# Patient Record
Sex: MALE | Race: WHITE | Employment: OTHER | URBAN - METROPOLITAN AREA
[De-identification: names, ages, dates, MRNs, and addresses within clinical notes are randomized per-mention and may not be internally consistent; named-entity substitution may affect disease eponyms.]

---

## 2017-01-19 DIAGNOSIS — I10 ESSENTIAL HYPERTENSION, BENIGN: Primary | ICD-10-CM

## 2017-01-19 RX ORDER — VALSARTAN 80 MG/1
TABLET ORAL
Qty: 90 TABLET | Refills: 0 | Status: SHIPPED | OUTPATIENT
Start: 2017-01-19 | End: 2017-01-23

## 2017-01-19 NOTE — TELEPHONE ENCOUNTER
Medication refilled per protocol.     LOV 12/20/2016    Future Appointments  Date Time Provider Nickie Bustillo   3/21/2017 7:30 AM Nurys Rose MD [facility]         Pending Prescriptions Disp Refills    VALSARTAN 80 MG Oral Tab [Pharmacy Med

## 2017-01-21 DIAGNOSIS — E11.9 TYPE 2 DIABETES MELLITUS WITHOUT COMPLICATION, UNSPECIFIED LONG TERM INSULIN USE STATUS: Primary | ICD-10-CM

## 2017-01-23 RX ORDER — VALSARTAN 160 MG/1
160 TABLET ORAL DAILY
Qty: 90 TABLET | Refills: 1 | Status: SHIPPED | OUTPATIENT
Start: 2017-01-23 | End: 2017-06-05

## 2017-01-23 NOTE — TELEPHONE ENCOUNTER
Pt called to report that [facility] mail order states they got a refill on Valsartan 80 mg, but at [address] [place] [address] 12/20/16 Pt was told by Dr Ivet Hensley that dose would be changed to 160. Notes do state that. Do you want to send Rx pended ? Routed to Dr Ivet Hensley.

## 2017-01-24 RX ORDER — FENOFIBRATE 145 MG/1
TABLET, COATED ORAL
Qty: 90 TABLET | Refills: 1 | Status: SHIPPED | OUTPATIENT
Start: 2017-01-24 | End: 2017-08-23

## 2017-01-24 RX ORDER — SITAGLIPTIN 100 MG/1
TABLET, FILM COATED ORAL
Qty: 90 TABLET | Refills: 1 | Status: SHIPPED | OUTPATIENT
Start: 2017-01-24 | End: 2017-02-13

## 2017-01-24 NOTE — TELEPHONE ENCOUNTER
Refill request sent from pharmacy for Metformin, Januvia, and Fenofibrate. Refills approved per protocol. LOV 12/20/16 and labs 12/20/16 and 09/09/016.

## 2017-01-31 ENCOUNTER — TELEPHONE (OUTPATIENT)
Dept: FAMILY MEDICINE CLINIC | Facility: CLINIC | Age: 62
End: 2017-01-31

## 2017-01-31 DIAGNOSIS — E11.9 TYPE 2 DIABETES MELLITUS WITHOUT COMPLICATION, UNSPECIFIED LONG TERM INSULIN USE STATUS: Primary | ICD-10-CM

## 2017-01-31 NOTE — TELEPHONE ENCOUNTER
Notified pt that these refills were sent to [facility] on 1/24/17 and we have confirmation that they received it.

## 2017-01-31 NOTE — TELEPHONE ENCOUNTER
Patient needs refill on FENOFIBRATE 145 MG Oral Tab, JANUVIA 100 MG Oral Tab, and METFORMIN HCL 1000 MG Oral Tab sent to [facility].

## 2017-02-13 ENCOUNTER — TELEPHONE (OUTPATIENT)
Dept: FAMILY MEDICINE CLINIC | Facility: CLINIC | Age: 62
End: 2017-02-13

## 2017-02-13 DIAGNOSIS — E11.9 TYPE 2 DIABETES MELLITUS WITHOUT COMPLICATION, UNSPECIFIED LONG TERM INSULIN USE STATUS: Primary | ICD-10-CM

## 2017-02-13 NOTE — TELEPHONE ENCOUNTER
Patient would like to know if rx for JANUVIA 100 MG Oral Tab can be re sent to [facility]. Patient has contact [facility] and they state patient does not have any refills.

## 2017-03-21 ENCOUNTER — OFFICE VISIT (OUTPATIENT)
Dept: FAMILY MEDICINE CLINIC | Facility: CLINIC | Age: 62
End: 2017-03-21

## 2017-03-21 VITALS
SYSTOLIC BLOOD PRESSURE: 136 MMHG | HEIGHT: 64 IN | RESPIRATION RATE: 16 BRPM | HEART RATE: 78 BPM | WEIGHT: 217 LBS | DIASTOLIC BLOOD PRESSURE: 70 MMHG | BODY MASS INDEX: 37.05 KG/M2 | TEMPERATURE: 98 F

## 2017-03-21 DIAGNOSIS — I10 ESSENTIAL HYPERTENSION, BENIGN: ICD-10-CM

## 2017-03-21 DIAGNOSIS — E78.5 HYPERLIPIDEMIA LDL GOAL <100: ICD-10-CM

## 2017-03-21 DIAGNOSIS — E11.9 TYPE 2 DIABETES MELLITUS WITHOUT COMPLICATION, UNSPECIFIED LONG TERM INSULIN USE STATUS: Primary | ICD-10-CM

## 2017-03-21 PROCEDURE — 99214 OFFICE O/P EST MOD 30 MIN: CPT | Performed by: FAMILY MEDICINE

## 2017-03-21 NOTE — PROGRESS NOTES
Patient presents with:  Diabetes: 3 month f/u      HPI:   Tammy Guerra is a 59year old male who presents for a diabetic visit. Typical blood sugar levels are range 130s-170s. Current diabetic medications are: Januvia and metformin.    Diet: pays at negative  Genitourinary:negative  Musculoskeletal:negative  Neurological: negative  EXAM:     /70 mmHg  Pulse 78  Temp(Src) 98 °F (36.7 °C)  Resp 16  Ht 64\"  Wt 217 lb  BMI 37.23 kg/m2  Wt Readings from Last 6 Encounters:  03/21/17 : 217 lb  12/20/1

## 2017-04-27 ENCOUNTER — MED REC SCAN ONLY (OUTPATIENT)
Dept: FAMILY MEDICINE CLINIC | Facility: CLINIC | Age: 62
End: 2017-04-27

## 2017-05-09 ENCOUNTER — OFFICE VISIT (OUTPATIENT)
Dept: FAMILY MEDICINE CLINIC | Facility: CLINIC | Age: 62
End: 2017-05-09

## 2017-05-09 VITALS
RESPIRATION RATE: 14 BRPM | WEIGHT: 216 LBS | HEIGHT: 64 IN | BODY MASS INDEX: 36.88 KG/M2 | SYSTOLIC BLOOD PRESSURE: 136 MMHG | DIASTOLIC BLOOD PRESSURE: 76 MMHG | TEMPERATURE: 99 F | HEART RATE: 70 BPM

## 2017-05-09 DIAGNOSIS — R10.13 EPIGASTRIC PAIN: ICD-10-CM

## 2017-05-09 DIAGNOSIS — R14.0 BLOATING: Primary | ICD-10-CM

## 2017-05-09 PROCEDURE — 99213 OFFICE O/P EST LOW 20 MIN: CPT | Performed by: FAMILY MEDICINE

## 2017-05-09 NOTE — PROGRESS NOTES
Patient presents with:  Abdominal Pain: x1 week w/ abdominal pain that is constant but pain sometimes increases with some nausea.  Pain level      HPI:   Liberty Peter is a 59year old male with PMH DM2, HLD, HTN who presents to the office for eval of isidoro

## 2017-06-06 RX ORDER — OMEPRAZOLE 20 MG/1
CAPSULE, DELAYED RELEASE ORAL
Qty: 90 CAPSULE | Refills: 1 | Status: SHIPPED | OUTPATIENT
Start: 2017-06-06 | End: 2017-12-29

## 2017-06-06 RX ORDER — VALSARTAN 160 MG/1
TABLET ORAL
Qty: 90 TABLET | Refills: 1 | Status: SHIPPED | OUTPATIENT
Start: 2017-06-06 | End: 2017-12-29

## 2017-07-06 LAB
DIRECT LDL: 91 MG/DL
HEMOGLOBIN A1C: 8.5 % OF TOTAL HGB

## 2017-07-11 ENCOUNTER — TELEPHONE (OUTPATIENT)
Dept: FAMILY MEDICINE CLINIC | Facility: CLINIC | Age: 62
End: 2017-07-11

## 2017-07-11 ENCOUNTER — OFFICE VISIT (OUTPATIENT)
Dept: FAMILY MEDICINE CLINIC | Facility: CLINIC | Age: 62
End: 2017-07-11

## 2017-07-11 VITALS
SYSTOLIC BLOOD PRESSURE: 134 MMHG | DIASTOLIC BLOOD PRESSURE: 70 MMHG | TEMPERATURE: 98 F | RESPIRATION RATE: 16 BRPM | WEIGHT: 215 LBS | HEART RATE: 68 BPM | HEIGHT: 64 IN | BODY MASS INDEX: 36.7 KG/M2

## 2017-07-11 DIAGNOSIS — I10 ESSENTIAL HYPERTENSION, BENIGN: ICD-10-CM

## 2017-07-11 DIAGNOSIS — M10.9 GOUTY ARTHROPATHY: ICD-10-CM

## 2017-07-11 DIAGNOSIS — E78.5 HYPERLIPIDEMIA LDL GOAL <100: ICD-10-CM

## 2017-07-11 DIAGNOSIS — E11.9 TYPE 2 DIABETES MELLITUS WITHOUT COMPLICATION, WITHOUT LONG-TERM CURRENT USE OF INSULIN (HCC): Primary | ICD-10-CM

## 2017-07-11 DIAGNOSIS — IMO0001 UNCONTROLLED TYPE 2 DIABETES MELLITUS WITHOUT COMPLICATION, WITHOUT LONG-TERM CURRENT USE OF INSULIN: Primary | ICD-10-CM

## 2017-07-11 DIAGNOSIS — Z12.5 PROSTATE CANCER SCREENING: ICD-10-CM

## 2017-07-11 PROCEDURE — 99214 OFFICE O/P EST MOD 30 MIN: CPT | Performed by: FAMILY MEDICINE

## 2017-07-11 NOTE — PROGRESS NOTES
Patient presents with:  Diabetes     HPI:   Thuy Delgadillo is a 59year old male who presents for a diabetic visit. Typical blood sugar levels are elevated more than usual, but he thinks this will return to normal with next check.    Current diabetic med negative  Genitourinary:negative  Neurological: negative    EXAM:     /70   Pulse 68   Temp 98.3 °F (36.8 °C)   Resp 16   Ht 64\"   Wt 215 lb   BMI 36.90 kg/m²   Wt Readings from Last 6 Encounters:  07/11/17 : 215 lb  05/09/17 : 216 lb  03/21/17 : 21

## 2017-07-20 DIAGNOSIS — E11.9 TYPE 2 DIABETES MELLITUS WITHOUT COMPLICATION, UNSPECIFIED LONG TERM INSULIN USE STATUS: ICD-10-CM

## 2017-07-27 ENCOUNTER — OFFICE VISIT (OUTPATIENT)
Dept: FAMILY MEDICINE CLINIC | Facility: CLINIC | Age: 62
End: 2017-07-27

## 2017-07-27 VITALS
HEIGHT: 64 IN | TEMPERATURE: 97 F | BODY MASS INDEX: 36.54 KG/M2 | DIASTOLIC BLOOD PRESSURE: 70 MMHG | WEIGHT: 214 LBS | SYSTOLIC BLOOD PRESSURE: 136 MMHG | HEART RATE: 60 BPM

## 2017-07-27 DIAGNOSIS — J01.40 ACUTE NON-RECURRENT PANSINUSITIS: Primary | ICD-10-CM

## 2017-07-27 PROCEDURE — 99213 OFFICE O/P EST LOW 20 MIN: CPT | Performed by: PHYSICIAN ASSISTANT

## 2017-07-27 RX ORDER — AZITHROMYCIN 250 MG/1
TABLET, FILM COATED ORAL
Qty: 6 TABLET | Refills: 0 | Status: SHIPPED | OUTPATIENT
Start: 2017-07-27 | End: 2017-10-17 | Stop reason: ALTCHOICE

## 2017-07-27 NOTE — PROGRESS NOTES
CC:  Patient presents with:  Sinus Problem: x 9 days, sinus pressure drainage      HPI: Ananda Wang presents for complaints of sinus pain and pressure, nasal congestion at night, a mild cough at night, and purulent nasal drainage.  Symptoms have been present for arms/legs; normal range of motion  Skin: No rashes or new skin lesions; no unusual moles  Neurological:  No weakness, numbness, or tingling  Hematological:  No unusual bruises or bleeding  Psychiatric/Behavioral: Normal mood; no anxiety; normal behavior barriers to learning. Medical education done. Outcome: Patient verbalizes understanding. Patient is notified to call with any questions, complications, allergies, or worsening or changing symptoms.   Patient is to call with any side effects or complications

## 2017-08-02 DIAGNOSIS — E11.9 TYPE 2 DIABETES MELLITUS WITHOUT COMPLICATION, UNSPECIFIED LONG TERM INSULIN USE STATUS: ICD-10-CM

## 2017-08-03 RX ORDER — SITAGLIPTIN 100 MG/1
TABLET, FILM COATED ORAL
Qty: 90 TABLET | Refills: 1 | Status: SHIPPED | OUTPATIENT
Start: 2017-08-03 | End: 2018-01-16

## 2017-08-23 DIAGNOSIS — E11.9 TYPE 2 DIABETES MELLITUS WITHOUT COMPLICATION, UNSPECIFIED LONG TERM INSULIN USE STATUS: ICD-10-CM

## 2017-08-23 DIAGNOSIS — E78.5 HYPERLIPIDEMIA LDL GOAL <100: Primary | ICD-10-CM

## 2017-08-24 RX ORDER — FENOFIBRATE 145 MG/1
TABLET, COATED ORAL
Qty: 90 TABLET | Refills: 0 | Status: SHIPPED | OUTPATIENT
Start: 2017-08-24 | End: 2017-11-12

## 2017-08-24 NOTE — TELEPHONE ENCOUNTER
Medication refilled per protocol.        Signed Prescriptions Disp Refills    FENOFIBRATE 145 MG Oral Tab 90 tablet 0      Sig: Take 1 tablet by mouth  daily        Authorizing Provider: Myesha Osborn        Ordering User: Severa Mini

## 2017-10-09 DIAGNOSIS — E11.9 TYPE 2 DIABETES MELLITUS WITHOUT COMPLICATION, UNSPECIFIED LONG TERM INSULIN USE STATUS: ICD-10-CM

## 2017-10-17 ENCOUNTER — OFFICE VISIT (OUTPATIENT)
Dept: FAMILY MEDICINE CLINIC | Facility: CLINIC | Age: 62
End: 2017-10-17

## 2017-10-17 VITALS
WEIGHT: 213 LBS | TEMPERATURE: 99 F | RESPIRATION RATE: 16 BRPM | BODY MASS INDEX: 36.82 KG/M2 | HEART RATE: 92 BPM | HEIGHT: 63.75 IN | SYSTOLIC BLOOD PRESSURE: 120 MMHG | DIASTOLIC BLOOD PRESSURE: 60 MMHG

## 2017-10-17 DIAGNOSIS — IMO0001 UNCONTROLLED TYPE 2 DIABETES MELLITUS WITHOUT COMPLICATION, WITHOUT LONG-TERM CURRENT USE OF INSULIN: ICD-10-CM

## 2017-10-17 DIAGNOSIS — Z00.00 ANNUAL PHYSICAL EXAM: Primary | ICD-10-CM

## 2017-10-17 DIAGNOSIS — M20.42 HAMMERTOE OF SECOND TOE OF LEFT FOOT: ICD-10-CM

## 2017-10-17 DIAGNOSIS — E78.5 HYPERLIPIDEMIA LDL GOAL <100: ICD-10-CM

## 2017-10-17 PROCEDURE — 99396 PREV VISIT EST AGE 40-64: CPT | Performed by: FAMILY MEDICINE

## 2017-10-17 PROCEDURE — 90686 IIV4 VACC NO PRSV 0.5 ML IM: CPT | Performed by: FAMILY MEDICINE

## 2017-10-17 PROCEDURE — 90471 IMMUNIZATION ADMIN: CPT | Performed by: FAMILY MEDICINE

## 2017-10-17 NOTE — PROGRESS NOTES
Patient presents with:  Physical     HPI:   Anny Lewis is a 59year old male who presents for a complete physical exam.     Last colonoscopy:  c-scope last year, repeat in 5 yrs. Last PSA:  Remains controlled. Immunizations: TDaP 6/2015.   PNA 9/20 7931;656(69): 4741-3162   (http://education. [employer]/faq/YBZ820)     TRIG 437 (H) 10/10/2017 07:28 AM      No results found for: PSA        Current Outpatient Prescriptions:  METFORMIN HCL 1000 MG Oral Tab TAKE 1 TABLET BY MOUTH  TWICE A DAY WI 213 lb   BMI 36.85 kg/m²   Body mass index is 36.85 kg/m². General appearance: alert, appears stated age and cooperative  Eyes: conjunctivae/corneas clear. PERRL, EOM's intact.    Ears: normal TM's and external ear canals both ears  Nose: Nares normal. 3  10/17/17      RTC 3 mo .  Labs ordered

## 2017-11-12 DIAGNOSIS — E11.9 TYPE 2 DIABETES MELLITUS WITHOUT COMPLICATION, UNSPECIFIED LONG TERM INSULIN USE STATUS: ICD-10-CM

## 2017-11-12 DIAGNOSIS — E78.5 HYPERLIPIDEMIA LDL GOAL <100: ICD-10-CM

## 2017-11-13 RX ORDER — FENOFIBRATE 145 MG/1
TABLET, COATED ORAL
Qty: 90 TABLET | Refills: 1 | Status: SHIPPED | OUTPATIENT
Start: 2017-11-13 | End: 2018-04-28

## 2018-01-02 RX ORDER — OMEPRAZOLE 20 MG/1
CAPSULE, DELAYED RELEASE ORAL
Qty: 90 CAPSULE | Refills: 3 | Status: SHIPPED | OUTPATIENT
Start: 2018-01-02 | End: 2018-12-03

## 2018-01-02 RX ORDER — VALSARTAN 160 MG/1
TABLET ORAL
Qty: 90 TABLET | Refills: 1 | Status: SHIPPED | OUTPATIENT
Start: 2018-01-02 | End: 2018-06-16

## 2018-01-16 DIAGNOSIS — E11.9 TYPE 2 DIABETES MELLITUS WITHOUT COMPLICATION, UNSPECIFIED LONG TERM INSULIN USE STATUS: ICD-10-CM

## 2018-01-17 RX ORDER — SITAGLIPTIN 100 MG/1
TABLET, FILM COATED ORAL
Qty: 90 TABLET | Refills: 1 | Status: SHIPPED | OUTPATIENT
Start: 2018-01-17 | End: 2018-07-01

## 2018-03-08 LAB
CHOL/HDLC RATIO: 9.1 (CALC)
CHOLESTEROL, TOTAL: 209 MG/DL
CREATININE, RANDOM URINE: 245 MG/DL (ref 20–370)
DIRECT LDL: 100 MG/DL
HDL CHOLESTEROL: 23 MG/DL
HEMOGLOBIN A1C: 8.9 % OF TOTAL HGB
MICROALBUMIN/CREATININE RATIO, RANDOM URINE: 12 MCG/MG CREAT
MICROALBUMIN: 3 MG/DL
NON-HDL CHOLESTEROL: 186 MG/DL (CALC)
TRIGLYCERIDES: 574 MG/DL

## 2018-03-14 ENCOUNTER — OFFICE VISIT (OUTPATIENT)
Dept: FAMILY MEDICINE CLINIC | Facility: CLINIC | Age: 63
End: 2018-03-14

## 2018-03-14 VITALS
RESPIRATION RATE: 14 BRPM | HEART RATE: 84 BPM | WEIGHT: 209.63 LBS | DIASTOLIC BLOOD PRESSURE: 60 MMHG | SYSTOLIC BLOOD PRESSURE: 126 MMHG | HEIGHT: 64 IN | TEMPERATURE: 98 F | BODY MASS INDEX: 35.79 KG/M2

## 2018-03-14 DIAGNOSIS — E78.5 HYPERLIPIDEMIA LDL GOAL <100: ICD-10-CM

## 2018-03-14 DIAGNOSIS — I10 ESSENTIAL HYPERTENSION, BENIGN: ICD-10-CM

## 2018-03-14 DIAGNOSIS — E11.9 TYPE 2 DIABETES MELLITUS WITHOUT COMPLICATION, WITHOUT LONG-TERM CURRENT USE OF INSULIN (HCC): Primary | ICD-10-CM

## 2018-03-14 PROCEDURE — 99214 OFFICE O/P EST MOD 30 MIN: CPT | Performed by: FAMILY MEDICINE

## 2018-03-14 NOTE — PROGRESS NOTES
Patient presents with:  Diabetes     HPI:   Trev Walker is a 57year old male who presents for a diabetic visit. Seems to go through cycles where the sugars are high regardless of what he eats. Typical blood sugar levels are 165-190 more recently. or equivalent per week         Comment: 3 servings 2-3 days a week     REVIEW OF SYSTEMS:     Constitutional: negative  Eyes: negative  Ears, nose, mouth, throat, and face: negative  Respiratory: negative  Cardiovascular: negative  Gastrointestinal: negati

## 2018-03-25 DIAGNOSIS — E11.9 TYPE 2 DIABETES MELLITUS WITHOUT COMPLICATION (HCC): ICD-10-CM

## 2018-03-26 NOTE — TELEPHONE ENCOUNTER
Rx request for Metformin failed protocol because last A1C was 8.9 on 3/7/18. LOV 3/14/18 and at that time, pt was advised to follow up in 3 months.  Future Appointments  Date Time Provider Nickie Bustillo   7/10/2018 7:30 AM Manjinder Devi MD [facility] 3

## 2018-04-28 DIAGNOSIS — E11.9 TYPE 2 DIABETES MELLITUS WITHOUT COMPLICATION (HCC): ICD-10-CM

## 2018-04-28 DIAGNOSIS — E78.5 HYPERLIPIDEMIA LDL GOAL <100: ICD-10-CM

## 2018-04-30 RX ORDER — FENOFIBRATE 145 MG/1
TABLET, COATED ORAL
Qty: 90 TABLET | Refills: 1 | Status: SHIPPED | OUTPATIENT
Start: 2018-04-30 | End: 2018-10-12

## 2018-04-30 NOTE — TELEPHONE ENCOUNTER
Incoming request for Fenofibrate. LOV 3/14/2018 and last labs done 3/7/2018. Future Appointments  Date Time Provider Nickie Bsutillo   7/10/2018 7:30 AM Sandhya Friedman MD [facility]     Medication passed protocol. Medication sent.

## 2018-06-18 RX ORDER — VALSARTAN 160 MG/1
TABLET ORAL
Qty: 90 TABLET | Refills: 3 | Status: SHIPPED | OUTPATIENT
Start: 2018-06-18 | End: 2018-07-30

## 2018-06-18 NOTE — TELEPHONE ENCOUNTER
Refill request for Valsartan passes protocol , but There is a contraindication due to pt's allergy to Lisinopril. Routed to Dr Maikol Sam.

## 2018-07-01 DIAGNOSIS — E11.9 TYPE 2 DIABETES MELLITUS WITHOUT COMPLICATION (HCC): ICD-10-CM

## 2018-07-03 RX ORDER — SITAGLIPTIN 100 MG/1
TABLET, FILM COATED ORAL
Qty: 90 TABLET | Refills: 0 | Status: SHIPPED | OUTPATIENT
Start: 2018-07-03 | End: 2018-09-21

## 2018-07-10 ENCOUNTER — TELEPHONE (OUTPATIENT)
Dept: FAMILY MEDICINE CLINIC | Facility: CLINIC | Age: 63
End: 2018-07-10

## 2018-07-10 NOTE — TELEPHONE ENCOUNTER
Patient has a diabetic check scheduled for 07/17/18 and is wondering if blood work is needed. If blood work is needed please send orders to [email or web address].  Please advise patient

## 2018-07-17 ENCOUNTER — OFFICE VISIT (OUTPATIENT)
Dept: FAMILY MEDICINE CLINIC | Facility: CLINIC | Age: 63
End: 2018-07-17
Payer: COMMERCIAL

## 2018-07-17 VITALS
HEIGHT: 64 IN | BODY MASS INDEX: 35.85 KG/M2 | HEART RATE: 76 BPM | DIASTOLIC BLOOD PRESSURE: 78 MMHG | SYSTOLIC BLOOD PRESSURE: 130 MMHG | TEMPERATURE: 99 F | RESPIRATION RATE: 14 BRPM | WEIGHT: 210 LBS

## 2018-07-17 DIAGNOSIS — E11.9 TYPE 2 DIABETES MELLITUS WITHOUT COMPLICATION, WITHOUT LONG-TERM CURRENT USE OF INSULIN (HCC): Primary | ICD-10-CM

## 2018-07-17 DIAGNOSIS — I10 ESSENTIAL HYPERTENSION, BENIGN: ICD-10-CM

## 2018-07-17 DIAGNOSIS — E78.5 HYPERLIPIDEMIA LDL GOAL <100: ICD-10-CM

## 2018-07-17 LAB
CARTRIDGE LOT#: 858 NUMERIC
HEMOGLOBIN A1C: 9.1 % (ref 4.3–5.6)

## 2018-07-17 PROCEDURE — 99214 OFFICE O/P EST MOD 30 MIN: CPT | Performed by: FAMILY MEDICINE

## 2018-07-17 PROCEDURE — 83036 HEMOGLOBIN GLYCOSYLATED A1C: CPT | Performed by: FAMILY MEDICINE

## 2018-07-17 RX ORDER — GLIMEPIRIDE 2 MG/1
2 TABLET ORAL 2 TIMES DAILY
Qty: 60 TABLET | Refills: 2 | Status: SHIPPED | OUTPATIENT
Start: 2018-07-17 | End: 2018-09-26

## 2018-07-17 NOTE — PROGRESS NOTES
Patient presents with:  Diabetes: f/u      HPI:   Jean Marie Glover is a 57year old male who presents for a diabetic visit. Last A1C 8.9. Sugars improved some - his home range is 150-215.     Typical blood sugar levels are 150-215  Current diabetic medicati tobacco: Never Used    Alcohol use Yes  3.6 - 5.4 oz/week    6 - 9 Standard drinks or equivalent per week         Comment: 3 servings 2-3 days a week       REVIEW OF SYSTEMS:     Constitutional: negative  Eyes: negative  Ears, nose, mouth, throat, and face EMG 3  07/17/18    Return in about 3 months (around 10/17/2018) for Annual Physical, Diabetic follow-up.

## 2018-07-30 ENCOUNTER — TELEPHONE (OUTPATIENT)
Dept: FAMILY MEDICINE CLINIC | Facility: CLINIC | Age: 63
End: 2018-07-30

## 2018-07-30 RX ORDER — LOSARTAN POTASSIUM 100 MG/1
100 TABLET ORAL DAILY
Qty: 30 TABLET | Refills: 0 | Status: SHIPPED | OUTPATIENT
Start: 2018-07-30 | End: 2018-08-21

## 2018-07-30 RX ORDER — LOSARTAN POTASSIUM 100 MG/1
100 TABLET ORAL DAILY
Qty: 90 TABLET | Refills: 3 | Status: SHIPPED | OUTPATIENT
Start: 2018-07-30 | End: 2019-06-28

## 2018-07-30 NOTE — TELEPHONE ENCOUNTER
Pt received email regarding recall of valsartan. Pt needs replacement rx to be placed. He is requesting 30 days from local [facility] in [place] on main. Long term rx [facility]. Please review chart and place appropriate orders.  Routed to Dr Jerrald Osler

## 2018-07-30 NOTE — TELEPHONE ENCOUNTER
Taking valsartan 160mg  Will change to losartan 100mg. 30 days to local, long term to mail order. Orders all sent in. Can we let pt know?

## 2018-08-17 ENCOUNTER — TELEPHONE (OUTPATIENT)
Dept: FAMILY MEDICINE CLINIC | Facility: CLINIC | Age: 63
End: 2018-08-17

## 2018-08-17 DIAGNOSIS — I10 ESSENTIAL HYPERTENSION, BENIGN: Primary | ICD-10-CM

## 2018-08-20 RX ORDER — LOSARTAN POTASSIUM 100 MG/1
TABLET ORAL
Qty: 30 TABLET | Refills: 0 | OUTPATIENT
Start: 2018-08-20

## 2018-08-20 NOTE — TELEPHONE ENCOUNTER
Refill request for Lisinopril passes protocol, but there is a contraindication: Reactive airway disease. Routed to Dr Ginny Vogel.

## 2018-08-21 RX ORDER — LOSARTAN POTASSIUM 100 MG/1
100 TABLET ORAL DAILY
Qty: 30 TABLET | Refills: 0 | Status: SHIPPED | OUTPATIENT
Start: 2018-08-21 | End: 2018-09-26

## 2018-08-21 NOTE — TELEPHONE ENCOUNTER
Patient is calling he is very busy leaving for a long vacation and he needs medicaton before he leaves on 8/24/18. I noticed two medications are listed he is requesting Losartan. Please review and call the patient back. Thank you.

## 2018-08-21 NOTE — TELEPHONE ENCOUNTER
Called and talked to patient mail order won't be sent out until tomorrow he needs short fill to local pharmacy sent 30 days to [facility]

## 2018-08-21 NOTE — TELEPHONE ENCOUNTER
A month ago we gave 30 days to local pharmacy and 90 days +3 refills to mail in. Why is he out of med? If he needs some for the trip ok to call into local, but im not sure why the m ail order shouldn't be there.

## 2018-09-07 DIAGNOSIS — E11.9 TYPE 2 DIABETES MELLITUS WITHOUT COMPLICATION (HCC): ICD-10-CM

## 2018-09-21 DIAGNOSIS — E11.9 TYPE 2 DIABETES MELLITUS WITHOUT COMPLICATION (HCC): ICD-10-CM

## 2018-09-24 RX ORDER — SITAGLIPTIN 100 MG/1
TABLET, FILM COATED ORAL
Qty: 90 TABLET | Refills: 0 | Status: SHIPPED | OUTPATIENT
Start: 2018-09-24 | End: 2018-12-13

## 2018-09-26 ENCOUNTER — OFFICE VISIT (OUTPATIENT)
Dept: FAMILY MEDICINE CLINIC | Facility: CLINIC | Age: 63
End: 2018-09-26
Payer: COMMERCIAL

## 2018-09-26 VITALS
BODY MASS INDEX: 36.75 KG/M2 | DIASTOLIC BLOOD PRESSURE: 60 MMHG | HEART RATE: 84 BPM | RESPIRATION RATE: 16 BRPM | TEMPERATURE: 99 F | HEIGHT: 63.5 IN | WEIGHT: 210 LBS | SYSTOLIC BLOOD PRESSURE: 110 MMHG

## 2018-09-26 DIAGNOSIS — Z23 NEEDS FLU SHOT: ICD-10-CM

## 2018-09-26 DIAGNOSIS — I10 ESSENTIAL HYPERTENSION, BENIGN: ICD-10-CM

## 2018-09-26 DIAGNOSIS — Z00.00 ANNUAL PHYSICAL EXAM: Primary | ICD-10-CM

## 2018-09-26 DIAGNOSIS — E66.01 SEVERE OBESITY (BMI 35.0-39.9) WITH COMORBIDITY (HCC): ICD-10-CM

## 2018-09-26 DIAGNOSIS — IMO0001 UNCONTROLLED TYPE 2 DIABETES MELLITUS WITHOUT COMPLICATION, WITHOUT LONG-TERM CURRENT USE OF INSULIN: ICD-10-CM

## 2018-09-26 DIAGNOSIS — E78.5 HYPERLIPIDEMIA LDL GOAL <100: ICD-10-CM

## 2018-09-26 LAB
CARTRIDGE LOT#: ABNORMAL NUMERIC
HEMOGLOBIN A1C: 6.6 % (ref 4.3–5.6)

## 2018-09-26 PROCEDURE — 90471 IMMUNIZATION ADMIN: CPT | Performed by: FAMILY MEDICINE

## 2018-09-26 PROCEDURE — 90686 IIV4 VACC NO PRSV 0.5 ML IM: CPT | Performed by: FAMILY MEDICINE

## 2018-09-26 PROCEDURE — 83036 HEMOGLOBIN GLYCOSYLATED A1C: CPT | Performed by: FAMILY MEDICINE

## 2018-09-26 PROCEDURE — 99396 PREV VISIT EST AGE 40-64: CPT | Performed by: FAMILY MEDICINE

## 2018-09-26 RX ORDER — GLIMEPIRIDE 2 MG/1
2 TABLET ORAL
Qty: 90 TABLET | Refills: 2 | Status: SHIPPED | OUTPATIENT
Start: 2018-09-26 | End: 2019-06-28

## 2018-09-26 NOTE — PROGRESS NOTES
Patient presents with:  Diabetes: three month diabetic check  Imm/Inj: flu shot     HPI:   Mirta Osuna is a 57year old male who presents for a complete physical exam.     Last colonoscopy:  2016 - due again in 2021. Last PSA:  1.4 a year ago.   Rare n (http://education. [employer]. com/faq/BFL555)      TRIG 574 (H) 03/07/2018 07:19 AM      No results found for: PSA        Current Outpatient Medications:  JANUVIA 100 MG Oral Tab TAKE 1 TABLET BY MOUTH  DAILY Disp: 90 tablet Rfl: 0   METFORMIN HCL 1 negative other than noted above. EXAM:   /60   Pulse 84   Temp 98.6 °F (37 °C) (Oral)   Resp 16   Ht 63.5\"   Wt 210 lb   BMI 36.62 kg/m²   Body mass index is 36.62 kg/m². General appearance: alert, appears stated age and cooperative.   obese Severe obesity (BMI 35.0-39. 9) with comorbidity ([address]  Stable weight. Work on healthy dieting.      6. Needs flu shot  Given.  - Josefa Gonzalez 0.5 Armando Peck M.D.   [facility] 3  09/26/18    RTC

## 2018-10-12 DIAGNOSIS — E78.5 HYPERLIPIDEMIA LDL GOAL <100: ICD-10-CM

## 2018-10-12 DIAGNOSIS — E11.9 TYPE 2 DIABETES MELLITUS WITHOUT COMPLICATION (HCC): ICD-10-CM

## 2018-10-13 RX ORDER — FENOFIBRATE 145 MG/1
TABLET, COATED ORAL
Qty: 90 TABLET | Refills: 1 | Status: SHIPPED | OUTPATIENT
Start: 2018-10-13 | End: 2019-04-11

## 2018-11-30 DIAGNOSIS — E11.9 TYPE 2 DIABETES MELLITUS WITHOUT COMPLICATION (HCC): ICD-10-CM

## 2018-12-04 RX ORDER — OMEPRAZOLE 20 MG/1
CAPSULE, DELAYED RELEASE ORAL
Qty: 90 CAPSULE | Refills: 3 | Status: SHIPPED | OUTPATIENT
Start: 2018-12-04 | End: 2019-06-28

## 2018-12-04 NOTE — TELEPHONE ENCOUNTER
LOV 9/26/2018     Future Appointments   Date Time Provider Nickie Bustillo   1/15/2019  7:30 AM Shilpi Tapia MD [facility] 3 [facility]        Refill request for:    Requested Prescriptions     Pending Prescriptions Disp Refills   • OMEPRAZOLE 20 MG Oral

## 2018-12-04 NOTE — TELEPHONE ENCOUNTER
Name from pharmacy: MetFORMIN 1000MG TABLET          Will file in chart as: METFORMIN HCL 1000 MG Oral Tab    Sig: TAKE 1 TABLET BY MOUTH  TWICE A DAY WITH MEALS    Disp:  180 tablet    Refills:  0 (Pharmacy requested: Not specified)    Start: 11/30/2018

## 2018-12-13 DIAGNOSIS — E11.9 TYPE 2 DIABETES MELLITUS WITHOUT COMPLICATION (HCC): ICD-10-CM

## 2018-12-14 RX ORDER — SITAGLIPTIN 100 MG/1
TABLET, FILM COATED ORAL
Qty: 90 TABLET | Refills: 0 | Status: SHIPPED | OUTPATIENT
Start: 2018-12-14 | End: 2019-04-10

## 2019-01-15 ENCOUNTER — OFFICE VISIT (OUTPATIENT)
Dept: FAMILY MEDICINE CLINIC | Facility: CLINIC | Age: 64
End: 2019-01-15
Payer: COMMERCIAL

## 2019-01-15 VITALS
WEIGHT: 214.19 LBS | SYSTOLIC BLOOD PRESSURE: 138 MMHG | RESPIRATION RATE: 16 BRPM | DIASTOLIC BLOOD PRESSURE: 72 MMHG | HEART RATE: 68 BPM | TEMPERATURE: 98 F | HEIGHT: 63.5 IN | BODY MASS INDEX: 37.48 KG/M2

## 2019-01-15 DIAGNOSIS — E66.01 SEVERE OBESITY (BMI 35.0-39.9) WITH COMORBIDITY (HCC): ICD-10-CM

## 2019-01-15 DIAGNOSIS — E11.9 TYPE 2 DIABETES MELLITUS WITHOUT COMPLICATION (HCC): ICD-10-CM

## 2019-01-15 DIAGNOSIS — I10 ESSENTIAL HYPERTENSION, BENIGN: Primary | ICD-10-CM

## 2019-01-15 DIAGNOSIS — E78.5 HYPERLIPIDEMIA LDL GOAL <100: ICD-10-CM

## 2019-01-15 LAB
CARTRIDGE LOT#: ABNORMAL NUMERIC
HEMOGLOBIN A1C: 6.2 % (ref 4.3–5.6)

## 2019-01-15 PROCEDURE — 99214 OFFICE O/P EST MOD 30 MIN: CPT | Performed by: FAMILY MEDICINE

## 2019-01-15 PROCEDURE — 83036 HEMOGLOBIN GLYCOSYLATED A1C: CPT | Performed by: FAMILY MEDICINE

## 2019-01-15 NOTE — PROGRESS NOTES
No chief complaint on file. HPI:   Gregorio Ferrell is a 70year old male who presents for a diabetic visit. Last year A1C went from 9.1 to 6.6 in 2 months.     Typical blood sugar levels are 80 this AM.    Current diabetic medications are: Januvia, dayanara 1.00        Years: 20.00        Pack years: 20        Types: Cigarettes        Quit date: 1990        Years since quittin.0      Smokeless tobacco: Never Used    Alcohol use:  Yes      Alcohol/week: 3.6 - 5.4 oz      Types: 6 - 9 Standard drinks or ARB    3. Hyperlipidemia LDL goal <100  On fenofibrate, no statin  Recheck lipids. LDL goal 100    4. Severe obesity (BMI 35.0-39. 9) with comorbidity ([address]  Need to make improvement, currently stable  Watch diet, more exercise.        Return May , for Diab

## 2019-04-05 ENCOUNTER — OFFICE VISIT (OUTPATIENT)
Dept: FAMILY MEDICINE CLINIC | Facility: CLINIC | Age: 64
End: 2019-04-05
Payer: COMMERCIAL

## 2019-04-05 ENCOUNTER — TELEPHONE (OUTPATIENT)
Dept: FAMILY MEDICINE CLINIC | Facility: CLINIC | Age: 64
End: 2019-04-05

## 2019-04-05 ENCOUNTER — HOSPITAL ENCOUNTER (OUTPATIENT)
Dept: CT IMAGING | Age: 64
Discharge: HOME | End: 2019-04-05
Attending: FAMILY MEDICINE
Payer: COMMERCIAL

## 2019-04-05 VITALS
WEIGHT: 218 LBS | SYSTOLIC BLOOD PRESSURE: 136 MMHG | OXYGEN SATURATION: 98 % | DIASTOLIC BLOOD PRESSURE: 70 MMHG | TEMPERATURE: 99 F | HEART RATE: 78 BPM | HEIGHT: 63.5 IN | BODY MASS INDEX: 38.15 KG/M2 | RESPIRATION RATE: 16 BRPM

## 2019-04-05 DIAGNOSIS — R10.9 FLANK PAIN: Primary | ICD-10-CM

## 2019-04-05 DIAGNOSIS — R10.9 FLANK PAIN: ICD-10-CM

## 2019-04-05 DIAGNOSIS — R31.29 MICROSCOPIC HEMATURIA: ICD-10-CM

## 2019-04-05 DIAGNOSIS — N20.0 RENAL LITHIASIS: Primary | ICD-10-CM

## 2019-04-05 PROCEDURE — 99214 OFFICE O/P EST MOD 30 MIN: CPT | Performed by: FAMILY MEDICINE

## 2019-04-05 PROCEDURE — 74176 CT ABD & PELVIS W/O CONTRAST: CPT | Performed by: FAMILY MEDICINE

## 2019-04-05 PROCEDURE — 87086 URINE CULTURE/COLONY COUNT: CPT | Performed by: FAMILY MEDICINE

## 2019-04-05 PROCEDURE — 81003 URINALYSIS AUTO W/O SCOPE: CPT | Performed by: FAMILY MEDICINE

## 2019-04-05 RX ORDER — TAMSULOSIN HYDROCHLORIDE 0.4 MG/1
0.4 CAPSULE ORAL DAILY
Qty: 30 CAPSULE | Refills: 0 | Status: SHIPPED | OUTPATIENT
Start: 2019-04-05 | End: 2019-05-05

## 2019-04-05 RX ORDER — TRAMADOL HYDROCHLORIDE 50 MG/1
50 TABLET ORAL EVERY 8 HOURS PRN
Qty: 20 TABLET | Refills: 0 | Status: SHIPPED | OUTPATIENT
Start: 2019-04-05 | End: 2019-04-13

## 2019-04-05 NOTE — PROGRESS NOTES
Chief Complaint:  Patient presents with:  Low Back Pain: Started yesterday, pain on Left Side of Low back     HPI:  This is a 70year old male patient presenting for Low Back Pain (Started yesterday, pain on Left Side of Low back )    Developed slight pain TONSILLECTOMY        No family history on file.    Social History    Tobacco Use      Smoking status: Former Smoker        Packs/day: 1.00        Years: 20.00        Pack years: 20        Types: Cigarettes        Quit date: 1/1/1990        Years since [facility] L4-S1, +5/5 strength LE B/L, +2/4 patellar reflexes B/L  EXTREMITIES: warm and well perfused  PSYCH: pleasant and cooperative, no obvious depression or anxiety    Results for orders placed or performed in visit on 04/05/19   URINALYSIS, AUTO, W/O SCOPE KIDNEYSTONE 2D RNDR(NO IV,NO ORAL)(CPT=74176); Future  -     traMADol HCl 50 MG Oral Tab; Take 1 tablet (50 mg total) by mouth every 8 (eight) hours as needed for Pain.     Concerning signs and symptoms that warrant returning to the clinic reviewed and nia

## 2019-04-05 NOTE — TELEPHONE ENCOUNTER
Left detailed msg regarding pt's CT on his approved VM. Flomax 0.4 mg once daily sent to his  Home	[place]. Patient told to go to THE [facility] ED with severe pain not controlled by meds given by Dr. Kary Sánchez today.     Dr. Kary Sánchez will likely refer to [facility]

## 2019-04-08 ENCOUNTER — TELEPHONE (OUTPATIENT)
Dept: FAMILY MEDICINE CLINIC | Facility: CLINIC | Age: 64
End: 2019-04-08

## 2019-04-08 DIAGNOSIS — N21.0 BLADDER STONE: ICD-10-CM

## 2019-04-08 DIAGNOSIS — N20.1 URETEROLITHIASIS: Primary | ICD-10-CM

## 2019-04-08 NOTE — TELEPHONE ENCOUNTER
Louann Perez,  [facility]  [place] [id number] [phone]     Called [facility] to call back to see if we can get a sooner appointment

## 2019-04-08 NOTE — TELEPHONE ENCOUNTER
Patient is calling stating he was given referral to see Urologist. Patient was not able to get in right away. Patient is scheduled to see Dr. Brandon Mohamud 5/6/19.  Patient wants to know if this is okay with Dr. April Martinez or should he be seen sooner by someone el

## 2019-04-10 ENCOUNTER — TELEPHONE (OUTPATIENT)
Dept: FAMILY MEDICINE CLINIC | Facility: CLINIC | Age: 64
End: 2019-04-10

## 2019-04-10 DIAGNOSIS — E11.9 TYPE 2 DIABETES MELLITUS WITHOUT COMPLICATION (HCC): ICD-10-CM

## 2019-04-10 NOTE — TELEPHONE ENCOUNTER
Patient is calling requesting a refill on JANUVIA 100 MG Oral Tab medication.  Would like is sent to his new mail order   [address] [place] [address]

## 2019-04-10 NOTE — TELEPHONE ENCOUNTER
Called and talked to patient [facility] is not a pharmacy but it is a , the [facility]  [phone] sent new RX to them

## 2019-04-11 ENCOUNTER — TELEPHONE (OUTPATIENT)
Dept: FAMILY MEDICINE CLINIC | Facility: CLINIC | Age: 64
End: 2019-04-11

## 2019-04-11 DIAGNOSIS — E11.9 TYPE 2 DIABETES MELLITUS WITHOUT COMPLICATION (HCC): ICD-10-CM

## 2019-04-11 DIAGNOSIS — E78.5 HYPERLIPIDEMIA LDL GOAL <100: ICD-10-CM

## 2019-04-11 NOTE — TELEPHONE ENCOUNTER
Pt is requesting a refill of Fenofibrate because his insurance is expiring at the end of the months. Request fails protocol because last lipids were done 3/7/18. Pt has labs ordered, but he has not completed the labs.     Advised that pt have fasting la

## 2019-04-11 NOTE — TELEPHONE ENCOUNTER
Patient is calling in his insurance is terminating at the end of the month because he is retiring and he is needing his Fenofibrate 145 mg refilled please send to:    Michael Poe, [facility] [address] [phone], [phone]

## 2019-04-13 PROCEDURE — 82365 CALCULUS SPECTROSCOPY: CPT | Performed by: UROLOGY

## 2019-04-17 RX ORDER — FENOFIBRATE 145 MG/1
145 TABLET, COATED ORAL
Qty: 90 TABLET | Refills: 0 | Status: SHIPPED | OUTPATIENT
Start: 2019-04-17 | End: 2019-06-28

## 2019-06-28 DIAGNOSIS — E11.9 TYPE 2 DIABETES MELLITUS WITHOUT COMPLICATION (HCC): ICD-10-CM

## 2019-06-28 DIAGNOSIS — IMO0001 UNCONTROLLED TYPE 2 DIABETES MELLITUS WITHOUT COMPLICATION, WITHOUT LONG-TERM CURRENT USE OF INSULIN: ICD-10-CM

## 2019-06-28 DIAGNOSIS — E78.5 HYPERLIPIDEMIA LDL GOAL <100: ICD-10-CM

## 2019-06-28 RX ORDER — FENOFIBRATE 145 MG/1
145 TABLET, COATED ORAL
Qty: 90 TABLET | Refills: 0 | Status: SHIPPED | OUTPATIENT
Start: 2019-06-28 | End: 2019-10-04

## 2019-06-28 RX ORDER — GLIMEPIRIDE 2 MG/1
2 TABLET ORAL
Qty: 90 TABLET | Refills: 0 | Status: SHIPPED | OUTPATIENT
Start: 2019-06-28 | End: 2019-10-04

## 2019-06-28 RX ORDER — OMEPRAZOLE 20 MG/1
CAPSULE, DELAYED RELEASE ORAL
Qty: 90 CAPSULE | Refills: 3 | Status: SHIPPED | OUTPATIENT
Start: 2019-06-28 | End: 2020-01-08

## 2019-06-28 RX ORDER — LOSARTAN POTASSIUM 100 MG/1
100 TABLET ORAL DAILY
Qty: 90 TABLET | Refills: 0 | Status: SHIPPED | OUTPATIENT
Start: 2019-06-28 | End: 2019-10-04

## 2019-06-28 NOTE — TELEPHONE ENCOUNTER
Requested Prescriptions     Pending Prescriptions Disp Refills   • omeprazole 20 MG Oral Capsule Delayed Release 90 capsule 3     Sig: Take 1 capsule (20 mg total) by mouth.    • losartan 100 MG Oral Tab 90 tablet 3     Sig: Take 1 tablet (100 mg total) by

## 2019-07-05 ENCOUNTER — OFFICE VISIT (OUTPATIENT)
Dept: FAMILY MEDICINE CLINIC | Facility: CLINIC | Age: 64
End: 2019-07-05
Payer: MEDICARE

## 2019-07-05 VITALS
SYSTOLIC BLOOD PRESSURE: 122 MMHG | HEART RATE: 80 BPM | TEMPERATURE: 98 F | DIASTOLIC BLOOD PRESSURE: 64 MMHG | HEIGHT: 64 IN | WEIGHT: 218 LBS | BODY MASS INDEX: 37.22 KG/M2 | RESPIRATION RATE: 16 BRPM

## 2019-07-05 DIAGNOSIS — I10 ESSENTIAL HYPERTENSION, BENIGN: ICD-10-CM

## 2019-07-05 DIAGNOSIS — E11.9 TYPE 2 DIABETES MELLITUS WITHOUT COMPLICATION, WITHOUT LONG-TERM CURRENT USE OF INSULIN (HCC): Primary | ICD-10-CM

## 2019-07-05 DIAGNOSIS — E78.5 HYPERLIPIDEMIA LDL GOAL <100: ICD-10-CM

## 2019-07-05 PROCEDURE — 99214 OFFICE O/P EST MOD 30 MIN: CPT | Performed by: FAMILY MEDICINE

## 2019-07-05 NOTE — PROGRESS NOTES
Patient presents with:  Diabetes     HPI:   Joseph Miranda is a 70year old male who presents for a diabetic visit. A1C overall controlled, has been in the 6's for about a year.   Feels like sugars are good  Typical blood sugar levels are below 120 every d Pulse 80   Temp 98.2 °F (36.8 °C) (Oral)   Resp 16   Ht 64\"   Wt 218 lb   BMI 37.42 kg/m²   Wt Readings from Last 6 Encounters:  07/05/19 : 218 lb  04/13/19 : 210 lb  04/05/19 : 218 lb  01/15/19 : 214 lb 3.2 oz  09/26/18 : 210 lb  07/17/18 : 210 lb      G

## 2019-08-21 ENCOUNTER — OFFICE VISIT (OUTPATIENT)
Dept: FAMILY MEDICINE CLINIC | Facility: CLINIC | Age: 64
End: 2019-08-21
Payer: MEDICARE

## 2019-08-21 VITALS
WEIGHT: 216.81 LBS | BODY MASS INDEX: 37.94 KG/M2 | TEMPERATURE: 98 F | HEIGHT: 63.5 IN | DIASTOLIC BLOOD PRESSURE: 78 MMHG | SYSTOLIC BLOOD PRESSURE: 134 MMHG | HEART RATE: 68 BPM | RESPIRATION RATE: 18 BRPM

## 2019-08-21 DIAGNOSIS — M75.01 ADHESIVE CAPSULITIS OF RIGHT SHOULDER: Primary | ICD-10-CM

## 2019-08-21 PROCEDURE — 99213 OFFICE O/P EST LOW 20 MIN: CPT | Performed by: FAMILY MEDICINE

## 2019-08-21 NOTE — PROGRESS NOTES
Patient presents with:  Shoulder Pain: Right shoulder, pt has had ongoing pain for years     HPI:   Kelly Lamb is a 70year old male who presents to the office for a chronic R shoulder pain.   Mainly after playing golf, will have a persistent shoulder p

## 2019-08-27 ENCOUNTER — OFFICE VISIT (OUTPATIENT)
Dept: PHYSICAL THERAPY | Age: 64
End: 2019-08-27
Attending: FAMILY MEDICINE
Payer: MEDICARE

## 2019-08-27 DIAGNOSIS — M75.01 ADHESIVE CAPSULITIS OF RIGHT SHOULDER: ICD-10-CM

## 2019-08-27 PROCEDURE — 97161 PT EVAL LOW COMPLEX 20 MIN: CPT

## 2019-08-27 PROCEDURE — 97110 THERAPEUTIC EXERCISES: CPT

## 2019-08-27 PROCEDURE — 97140 MANUAL THERAPY 1/> REGIONS: CPT

## 2019-08-27 NOTE — PROGRESS NOTES
SHOULDER EVALUATION:   Referring Physician: Dr. Ashley Braun  Diagnosis: R shoulder impingement     Date of Service: 8/27/2019     PATIENT SUMMARY   Thuy Delgadillo is a 70year old male who presents to therapy today with complaints of daily intermittent R shoul AROM due to impingement pain. Decreased latissimus dorsi muscle length, decreased middle and lower trapezius, serratus anterior strength.   Functional deficits include but are not limited to difficulty reaching head height and above shelves,  Reaching/retre with shrug ups x 5 seconds 10 reps. HO issued for HEP.      Charges: PT Eval Low Complexity, man 1 there ex 1      Total Timed Treatment: 20 min     Total Treatment Time: 45 min       PLAN OF CARE:    Goals: (to be met in 10 visits)   -Decrease shoulder jose

## 2019-08-28 ENCOUNTER — TELEPHONE (OUTPATIENT)
Dept: FAMILY MEDICINE CLINIC | Facility: CLINIC | Age: 64
End: 2019-08-28

## 2019-08-28 DIAGNOSIS — M75.01 ADHESIVE CAPSULITIS OF RIGHT SHOULDER: Primary | ICD-10-CM

## 2019-08-28 NOTE — TELEPHONE ENCOUNTER
Called and talked to patient he stated he is going on a 3 week vacation that involves a lot of climbing and hiking and he does not want to start this trip then not be able to do everything so the PT suggested a short course of steroids.  He told the patient

## 2019-08-28 NOTE — TELEPHONE ENCOUNTER
Patient was referred to physical therapy and has started and patient states that the therapist stated patient could benefit from a short term steroid.  Patient requesting prescription

## 2019-08-29 RX ORDER — PREDNISONE 20 MG/1
40 TABLET ORAL DAILY
Qty: 10 TABLET | Refills: 0 | Status: SHIPPED | OUTPATIENT
Start: 2019-08-29 | End: 2019-09-03

## 2019-08-29 NOTE — TELEPHONE ENCOUNTER
Called patient and informed him of new script at [facility]'s he will pick this up before the trip

## 2019-09-03 ENCOUNTER — OFFICE VISIT (OUTPATIENT)
Dept: PHYSICAL THERAPY | Age: 64
End: 2019-09-03
Attending: INTERNAL MEDICINE
Payer: MEDICARE

## 2019-09-03 PROCEDURE — 97112 NEUROMUSCULAR REEDUCATION: CPT

## 2019-09-03 PROCEDURE — 97140 MANUAL THERAPY 1/> REGIONS: CPT

## 2019-09-03 PROCEDURE — 97110 THERAPEUTIC EXERCISES: CPT

## 2019-09-03 NOTE — PROGRESS NOTES
Diagnosis: R shoulder impingement        Treatment Number: 2    Subjective: Reports pain with sleeping decreasing. Does not wake him as often, no longer nightly. Reports good HEP compliancy.   Has prescription for AI medication for while away on 3 week hi difficulty. -Improve middle and lower trapezius MMT to > 4/5 so pt can perform overhead reaching activities w/o limitation or pain.   -Improve overall shoulder strength to > 4/5 so pt can place and reive light objects overhead with little difficulty or pa

## 2019-10-04 DIAGNOSIS — IMO0001 UNCONTROLLED TYPE 2 DIABETES MELLITUS WITHOUT COMPLICATION, WITHOUT LONG-TERM CURRENT USE OF INSULIN: ICD-10-CM

## 2019-10-04 DIAGNOSIS — E11.9 TYPE 2 DIABETES MELLITUS WITHOUT COMPLICATION (HCC): ICD-10-CM

## 2019-10-04 DIAGNOSIS — E78.5 HYPERLIPIDEMIA LDL GOAL <100: ICD-10-CM

## 2019-10-05 RX ORDER — LOSARTAN POTASSIUM 100 MG/1
TABLET ORAL
Qty: 90 TABLET | Refills: 0 | Status: SHIPPED
Start: 2019-10-05 | End: 2019-10-09

## 2019-10-05 RX ORDER — GLIMEPIRIDE 2 MG/1
TABLET ORAL
Qty: 90 TABLET | Refills: 0 | Status: SHIPPED
Start: 2019-10-05 | End: 2019-10-09

## 2019-10-05 RX ORDER — FENOFIBRATE 145 MG/1
TABLET, COATED ORAL
Qty: 90 TABLET | Refills: 0 | Status: SHIPPED
Start: 2019-10-05 | End: 2019-10-09

## 2019-10-05 NOTE — TELEPHONE ENCOUNTER
Requested Prescriptions     Pending Prescriptions Disp Refills   • metFORMIN HCl 1000 MG Oral Tab [Pharmacy Med Name: METFORMIN TAB 1000MG] 180 tablet 0     Sig: TAKE 1 TABLET TWICE DAILY  WITH MEALS   • LOSARTAN 100 MG Oral Tab [Pharmacy Med Name: Ghada Mondragon

## 2019-10-08 ENCOUNTER — IMMUNIZATION (OUTPATIENT)
Dept: FAMILY MEDICINE CLINIC | Facility: CLINIC | Age: 64
End: 2019-10-08
Payer: MEDICARE

## 2019-10-08 DIAGNOSIS — Z23 NEED FOR VACCINATION: ICD-10-CM

## 2019-10-08 PROCEDURE — G0008 ADMIN INFLUENZA VIRUS VAC: HCPCS | Performed by: FAMILY MEDICINE

## 2019-10-08 PROCEDURE — 90662 IIV NO PRSV INCREASED AG IM: CPT | Performed by: FAMILY MEDICINE

## 2019-10-09 ENCOUNTER — TELEPHONE (OUTPATIENT)
Dept: FAMILY MEDICINE CLINIC | Facility: CLINIC | Age: 64
End: 2019-10-09

## 2019-10-09 DIAGNOSIS — E11.9 TYPE 2 DIABETES MELLITUS WITHOUT COMPLICATION (HCC): ICD-10-CM

## 2019-10-09 DIAGNOSIS — IMO0001 UNCONTROLLED TYPE 2 DIABETES MELLITUS WITHOUT COMPLICATION, WITHOUT LONG-TERM CURRENT USE OF INSULIN: ICD-10-CM

## 2019-10-09 DIAGNOSIS — E78.5 HYPERLIPIDEMIA LDL GOAL <100: ICD-10-CM

## 2019-10-09 RX ORDER — LOSARTAN POTASSIUM 100 MG/1
100 TABLET ORAL
Qty: 90 TABLET | Refills: 0 | Status: SHIPPED | OUTPATIENT
Start: 2019-10-09 | End: 2020-01-08

## 2019-10-09 RX ORDER — GLIMEPIRIDE 2 MG/1
TABLET ORAL
Qty: 90 TABLET | Refills: 0 | Status: SHIPPED | OUTPATIENT
Start: 2019-10-09 | End: 2020-01-08

## 2019-10-09 RX ORDER — FENOFIBRATE 145 MG/1
TABLET, COATED ORAL
Qty: 90 TABLET | Refills: 0 | Status: SHIPPED | OUTPATIENT
Start: 2019-10-09 | End: 2020-01-08

## 2019-10-09 NOTE — TELEPHONE ENCOUNTER
Patient states [facility] has not received refill on Metformin, Losartan, Glimepiride and Fenofibrate, please re-send.       Patient also now needing refill on Januvia, would also like sent to [facility]

## 2019-10-16 ENCOUNTER — TELEPHONE (OUTPATIENT)
Dept: FAMILY MEDICINE CLINIC | Facility: CLINIC | Age: 64
End: 2019-10-16

## 2019-10-16 DIAGNOSIS — E11.9 TYPE 2 DIABETES MELLITUS WITHOUT COMPLICATION (HCC): ICD-10-CM

## 2019-10-16 NOTE — TELEPHONE ENCOUNTER
Medication refilled per protocol. Requested Prescriptions     Signed Prescriptions Disp Refills   • SITagliptin Phosphate (JANUVIA) 100 MG Oral Tab 90 tablet 1     Sig: Take 1 tablet (100 mg total) by mouth once daily.      Authorizing Provider: Love Kelly,

## 2019-10-16 NOTE — TELEPHONE ENCOUNTER
Pharmacy mail order calling on status for SITagliptin Phosphate (JANUVIA) 100 MG Oral Tab. Medication was not refilled when telephone encounter was placed on 10/9.

## 2019-11-27 ENCOUNTER — OFFICE VISIT (OUTPATIENT)
Dept: FAMILY MEDICINE CLINIC | Facility: CLINIC | Age: 64
End: 2019-11-27
Payer: MEDICARE

## 2019-11-27 VITALS
BODY MASS INDEX: 38.61 KG/M2 | SYSTOLIC BLOOD PRESSURE: 136 MMHG | RESPIRATION RATE: 18 BRPM | DIASTOLIC BLOOD PRESSURE: 72 MMHG | TEMPERATURE: 99 F | WEIGHT: 220.63 LBS | HEART RATE: 82 BPM | HEIGHT: 63.5 IN

## 2019-11-27 DIAGNOSIS — J01.40 ACUTE NON-RECURRENT PANSINUSITIS: Primary | ICD-10-CM

## 2019-11-27 PROCEDURE — 99213 OFFICE O/P EST LOW 20 MIN: CPT | Performed by: PHYSICIAN ASSISTANT

## 2019-11-27 RX ORDER — AMOXICILLIN AND CLAVULANATE POTASSIUM 875; 125 MG/1; MG/1
1 TABLET, FILM COATED ORAL 2 TIMES DAILY
Qty: 14 TABLET | Refills: 0 | Status: SHIPPED | OUTPATIENT
Start: 2019-11-27 | End: 2019-12-04

## 2019-11-29 NOTE — PROGRESS NOTES
Patient presents with:  Cough: persistent cough, phlegm, yellowish;        HISTORY OF PRESENT ILLNESS  Anny Lewis is a 70year old male who presents for evaluation of cough.  For the past week, he has been having a persistent productive cough with yello Former Smoker        Packs/day: 1.00        Years: 20.00        Pack years: 20        Types: Cigarettes        Quit date: 1990        Years since quittin.9      Smokeless tobacco: Never Used    Alcohol use:  Yes      Alcohol/week: 6.0 - 9.0 standar

## 2019-12-10 ENCOUNTER — TELEPHONE (OUTPATIENT)
Dept: FAMILY MEDICINE CLINIC | Facility: CLINIC | Age: 64
End: 2019-12-10

## 2019-12-10 NOTE — TELEPHONE ENCOUNTER
Called talked to patient he did feel good for 1-2 days and drainage never stopped he is still having clear drainage and PND with a mild headache.  I suggested flonase for the remaining congestion and I will ask Ana GAUTAM what she wants him to do

## 2019-12-10 NOTE — TELEPHONE ENCOUNTER
Patient was seen on 11/27/19 and he still is not better, meds were gone a week ago and he is still feeling the same symptoms. Persistent cough, phlegm yellowish.

## 2019-12-11 RX ORDER — AZITHROMYCIN 500 MG/1
500 TABLET, FILM COATED ORAL DAILY
Qty: 5 TABLET | Refills: 0 | Status: SHIPPED | OUTPATIENT
Start: 2019-12-11 | End: 2019-12-16

## 2019-12-11 NOTE — TELEPHONE ENCOUNTER
Spoke with De telling him Azithromycin was ordered to the pharmacy by Vasquez Piña.   He verbalized understanding

## 2020-01-02 ENCOUNTER — TELEPHONE (OUTPATIENT)
Dept: FAMILY MEDICINE CLINIC | Facility: CLINIC | Age: 65
End: 2020-01-02

## 2020-01-08 ENCOUNTER — OFFICE VISIT (OUTPATIENT)
Dept: FAMILY MEDICINE CLINIC | Facility: CLINIC | Age: 65
End: 2020-01-08
Payer: MEDICARE

## 2020-01-08 VITALS
WEIGHT: 216.63 LBS | BODY MASS INDEX: 37.91 KG/M2 | HEART RATE: 80 BPM | HEIGHT: 63.5 IN | TEMPERATURE: 98 F | SYSTOLIC BLOOD PRESSURE: 138 MMHG | DIASTOLIC BLOOD PRESSURE: 72 MMHG | RESPIRATION RATE: 18 BRPM

## 2020-01-08 DIAGNOSIS — I10 ESSENTIAL HYPERTENSION, BENIGN: ICD-10-CM

## 2020-01-08 DIAGNOSIS — E11.9 TYPE 2 DIABETES MELLITUS WITHOUT COMPLICATION, WITHOUT LONG-TERM CURRENT USE OF INSULIN (HCC): ICD-10-CM

## 2020-01-08 DIAGNOSIS — Z00.00 ENCOUNTER FOR ANNUAL HEALTH EXAMINATION: Primary | ICD-10-CM

## 2020-01-08 DIAGNOSIS — Z23 NEED FOR VACCINATION: ICD-10-CM

## 2020-01-08 DIAGNOSIS — Z11.59 NEED FOR HEPATITIS C SCREENING TEST: ICD-10-CM

## 2020-01-08 DIAGNOSIS — E66.01 SEVERE OBESITY (BMI 35.0-39.9) WITH COMORBIDITY (HCC): ICD-10-CM

## 2020-01-08 DIAGNOSIS — E78.5 HYPERLIPIDEMIA LDL GOAL <100: ICD-10-CM

## 2020-01-08 DIAGNOSIS — K21.9 GASTROESOPHAGEAL REFLUX DISEASE, ESOPHAGITIS PRESENCE NOT SPECIFIED: ICD-10-CM

## 2020-01-08 PROCEDURE — G0009 ADMIN PNEUMOCOCCAL VACCINE: HCPCS | Performed by: FAMILY MEDICINE

## 2020-01-08 PROCEDURE — G0402 INITIAL PREVENTIVE EXAM: HCPCS | Performed by: FAMILY MEDICINE

## 2020-01-08 PROCEDURE — 90670 PCV13 VACCINE IM: CPT | Performed by: FAMILY MEDICINE

## 2020-01-08 RX ORDER — FENOFIBRATE 145 MG/1
TABLET, COATED ORAL
Qty: 90 TABLET | Refills: 3 | Status: SHIPPED | OUTPATIENT
Start: 2020-01-08 | End: 2021-02-03

## 2020-01-08 RX ORDER — LOSARTAN POTASSIUM 100 MG/1
100 TABLET ORAL
Qty: 90 TABLET | Refills: 3 | Status: SHIPPED | OUTPATIENT
Start: 2020-01-08 | End: 2021-02-03

## 2020-01-08 RX ORDER — OMEPRAZOLE 20 MG/1
CAPSULE, DELAYED RELEASE ORAL
Qty: 90 CAPSULE | Refills: 3 | Status: SHIPPED | OUTPATIENT
Start: 2020-01-08 | End: 2021-02-03

## 2020-01-08 RX ORDER — GLIMEPIRIDE 2 MG/1
TABLET ORAL
Qty: 90 TABLET | Refills: 3 | Status: SHIPPED | OUTPATIENT
Start: 2020-01-08 | End: 2020-12-08

## 2020-01-08 NOTE — PATIENT INSTRUCTIONS
Suzanna Amezcua's SCREENING SCHEDULE   Tests on this list are recommended by your physician but may not be covered, or covered at this frequency, by your insurer. Please check with your insurance carrier before scheduling to verify coverage.     PREVENTATI Colorectal Cancer Screening Covered up to Age 76     Colonoscopy Screen   Covered every 10 years- more often if abnormal Colonoscopy due on 03/17/2021 Update Health Maintenance if applicable    Flex Sigmoidoscopy Screen  Covered every 5 years No results concentrates   Clients of institutions for the mentally retarded   Persons who live in the same house as a HepB virus carrier   Homosexual men   Illicit injectable drug abusers     Tetanus Toxoid- Only covered with a cut with metal- TD and TDaP Not covered

## 2020-01-08 NOTE — PROGRESS NOTES
HPI:   Joan Valentin is a 75year old male who presents for a Medicare Initial Preventative Physical Exam (Welcome to Medicare- < 12 months on Medicare). Preventative Screening  Colonoscopy - 3/17/2016  Prostate - 1.4 in 2017. No prostate symptoms. quittin.0      Smokeless tobacco: Never Used         CAGE Alcohol screening   Mariza Mejia was screened for Alcohol abuse and had a score of 0 so is at low risk.      Patient Care Team: Patient Care Team:  Wu Montague MD as PCP - [facility] history of Essential hypertension, Essential hypertension, benign (11/22/2012), Gouty arthropathy (6/29/2012), Hyperlipidemia (6/29/2012), Sleep apnea (6/29/2012), and Type II diabetes mellitus (6/29/2012).     He  has a past surgical history that includes PERRL, conjunctiva/corneas clear, EOM's intact   Nose: Nares normal, septum midline, mucosa normal, no drainage or sinus tenderness   Throat: Lips, mucosa, and tongue normal; teeth and gums normal   Neck: Supple, symmetrical, trachea midline, no adenopathy mellitus without complication, without long-term current use of insulin ([address]  Need improved weight. Meds refilled, stable. - SITagliptin Phosphate (JANUVIA) 100 MG Oral Tab; Take 1 tablet (100 mg total) by mouth once daily. Dispense: 90 tablet;  Refi well-being?: (P) Social Interaction; Visiting Friends; Visiting Family    This section provided for quick review of chart, separate sheet to patient  [address] Internal Lab or Procedure External Lab or Procedure   Diabet retarded   Persons who live in the same house as a HepB virus carrier   Homosexual men   Illicit injectable drug abusers     Tetanus Toxoid  Only covered with a cut with metal- TD and TDaP Not covered by Medicare Part B) No vaccine history found This may b

## 2020-01-29 ENCOUNTER — TELEPHONE (OUTPATIENT)
Dept: FAMILY MEDICINE CLINIC | Facility: CLINIC | Age: 65
End: 2020-01-29

## 2020-01-29 NOTE — TELEPHONE ENCOUNTER
Spoke to pt on his Medicare Annual and he just had it on 1/8/20.  Will wait to schedule next one at a later time

## 2020-06-09 ENCOUNTER — TELEPHONE (OUTPATIENT)
Dept: FAMILY MEDICINE CLINIC | Facility: CLINIC | Age: 65
End: 2020-06-09

## 2020-07-15 ENCOUNTER — OFFICE VISIT (OUTPATIENT)
Dept: FAMILY MEDICINE CLINIC | Facility: CLINIC | Age: 65
End: 2020-07-15
Payer: MEDICARE

## 2020-07-15 VITALS
HEIGHT: 64.25 IN | SYSTOLIC BLOOD PRESSURE: 142 MMHG | RESPIRATION RATE: 12 BRPM | HEART RATE: 72 BPM | WEIGHT: 217.63 LBS | DIASTOLIC BLOOD PRESSURE: 70 MMHG | TEMPERATURE: 96 F | BODY MASS INDEX: 37.16 KG/M2

## 2020-07-15 DIAGNOSIS — N40.1 BPH ASSOCIATED WITH NOCTURIA: ICD-10-CM

## 2020-07-15 DIAGNOSIS — E11.9 TYPE 2 DIABETES MELLITUS WITHOUT COMPLICATION, WITHOUT LONG-TERM CURRENT USE OF INSULIN (HCC): Primary | ICD-10-CM

## 2020-07-15 DIAGNOSIS — Z12.5 SCREENING FOR MALIGNANT NEOPLASM OF PROSTATE: ICD-10-CM

## 2020-07-15 DIAGNOSIS — E78.5 HYPERLIPIDEMIA LDL GOAL <100: ICD-10-CM

## 2020-07-15 DIAGNOSIS — R35.1 BPH ASSOCIATED WITH NOCTURIA: ICD-10-CM

## 2020-07-15 DIAGNOSIS — I10 ESSENTIAL HYPERTENSION, BENIGN: ICD-10-CM

## 2020-07-15 LAB
CARTRIDGE LOT#: ABNORMAL NUMERIC
HEMOGLOBIN A1C: 7.6 % (ref 4.3–5.6)

## 2020-07-15 PROCEDURE — 99214 OFFICE O/P EST MOD 30 MIN: CPT | Performed by: FAMILY MEDICINE

## 2020-07-15 PROCEDURE — 83036 HEMOGLOBIN GLYCOSYLATED A1C: CPT | Performed by: FAMILY MEDICINE

## 2020-07-15 NOTE — PROGRESS NOTES
Patient presents with:  Diabetes: F/u visit     HPI:   Ed Vega is a 75year old male who presents for a diabetic visit.     Typical blood sugar levels are averaging 145  Current diabetic medications are: metformin, Januvia, glimepiride  Diet: aware o °F (35.7 °C) (Temporal)   Resp 12   Ht 64.25\"   Wt 217 lb 9.6 oz (98.7 kg)   BMI 37.06 kg/m²   Wt Readings from Last 6 Encounters:  07/15/20 : 217 lb 9.6 oz (98.7 kg)  01/08/20 : 216 lb 9.6 oz (98.2 kg)  11/27/19 : 220 lb 9.6 oz (100.1 kg)  08/21/19 : 216

## 2020-09-30 ENCOUNTER — OFFICE VISIT (OUTPATIENT)
Dept: FAMILY MEDICINE CLINIC | Facility: CLINIC | Age: 65
End: 2020-09-30
Payer: MEDICARE

## 2020-09-30 VITALS
BODY MASS INDEX: 37.59 KG/M2 | SYSTOLIC BLOOD PRESSURE: 166 MMHG | HEIGHT: 64 IN | HEART RATE: 80 BPM | TEMPERATURE: 98 F | DIASTOLIC BLOOD PRESSURE: 78 MMHG | WEIGHT: 220.19 LBS | RESPIRATION RATE: 16 BRPM

## 2020-09-30 DIAGNOSIS — M25.511 CHRONIC RIGHT SHOULDER PAIN: Primary | ICD-10-CM

## 2020-09-30 DIAGNOSIS — I10 ESSENTIAL HYPERTENSION, BENIGN: ICD-10-CM

## 2020-09-30 DIAGNOSIS — G89.29 CHRONIC RIGHT SHOULDER PAIN: Primary | ICD-10-CM

## 2020-09-30 PROCEDURE — 99213 OFFICE O/P EST LOW 20 MIN: CPT | Performed by: FAMILY MEDICINE

## 2020-09-30 NOTE — PROGRESS NOTES
Patient presents with:  Shoulder Pain: R shoulder pain. Constant pain x2 months     HPI:   Shameka Scott is a 75year old male who presents to the office for eval of shoulder pain. Shoulder - R shoulder pain. Chronic, on and off for 15 + years.   Saw duane With pains and recent trip, this is likely artificially more elevated  Check at home, and update me. If persists high, will need to augment therapy.          Edwardo Vasquez M.D.   [facility] 3  09/30/20

## 2020-10-01 ENCOUNTER — IMMUNIZATION (OUTPATIENT)
Dept: FAMILY MEDICINE CLINIC | Facility: CLINIC | Age: 65
End: 2020-10-01
Payer: MEDICARE

## 2020-10-01 DIAGNOSIS — Z23 NEED FOR VACCINATION: ICD-10-CM

## 2020-10-01 PROCEDURE — G0008 ADMIN INFLUENZA VIRUS VAC: HCPCS | Performed by: FAMILY MEDICINE

## 2020-10-01 PROCEDURE — 90662 IIV NO PRSV INCREASED AG IM: CPT | Performed by: FAMILY MEDICINE

## 2020-10-15 ENCOUNTER — PATIENT MESSAGE (OUTPATIENT)
Dept: FAMILY MEDICINE CLINIC | Facility: CLINIC | Age: 65
End: 2020-10-15

## 2020-10-15 NOTE — TELEPHONE ENCOUNTER
From: Nemo Mc  To: Gio Victor MD  Sent: 10/15/2020 11:10 AM CDT  Subject: Visit Follow-up Question    As we discussed, I have been taking my blood pressure for the last 11 days. Usually I take it several times in a row.  The following are the l

## 2020-10-16 ENCOUNTER — PATIENT MESSAGE (OUTPATIENT)
Dept: FAMILY MEDICINE CLINIC | Facility: CLINIC | Age: 65
End: 2020-10-16

## 2020-10-16 DIAGNOSIS — I10 ESSENTIAL HYPERTENSION, BENIGN: Primary | ICD-10-CM

## 2020-10-16 RX ORDER — HYDROCHLOROTHIAZIDE 25 MG/1
25 TABLET ORAL DAILY
Qty: 90 TABLET | Refills: 1 | Status: SHIPPED | OUTPATIENT
Start: 2020-10-16 | End: 2020-11-16

## 2020-10-16 NOTE — TELEPHONE ENCOUNTER
From: Nemo Mc  To: Gio Victor MD  Sent: 10/16/2020 12:00 PM CDT  Subject: Other    That sounds fine. Send it to the [facility] on my chart. Will it be something that I can suspend taking once I have my shoulder pain taken care of?  I'd like to s

## 2020-10-21 ENCOUNTER — OFFICE VISIT (OUTPATIENT)
Dept: ORTHOPEDICS CLINIC | Facility: CLINIC | Age: 65
End: 2020-10-21
Payer: MEDICARE

## 2020-10-21 ENCOUNTER — HOSPITAL ENCOUNTER (OUTPATIENT)
Dept: GENERAL RADIOLOGY | Age: 65
Discharge: HOME | End: 2020-10-21
Attending: ORTHOPAEDIC SURGERY
Payer: MEDICARE

## 2020-10-21 DIAGNOSIS — M25.511 RIGHT SHOULDER PAIN, UNSPECIFIED CHRONICITY: ICD-10-CM

## 2020-10-21 DIAGNOSIS — M75.41 IMPINGEMENT SYNDROME OF RIGHT SHOULDER: Primary | ICD-10-CM

## 2020-10-21 DIAGNOSIS — M19.011 PRIMARY OSTEOARTHRITIS OF RIGHT SHOULDER: ICD-10-CM

## 2020-10-21 PROCEDURE — 73030 X-RAY EXAM OF SHOULDER: CPT | Performed by: ORTHOPAEDIC SURGERY

## 2020-10-21 PROCEDURE — 99203 OFFICE O/P NEW LOW 30 MIN: CPT | Performed by: ORTHOPAEDIC SURGERY

## 2020-10-21 RX ORDER — DIAZEPAM 5 MG/1
TABLET ORAL
Qty: 2 TABLET | Refills: 0 | Status: SHIPPED | OUTPATIENT
Start: 2020-10-21 | End: 2020-11-17 | Stop reason: ALTCHOICE

## 2020-10-21 NOTE — PROGRESS NOTES
[facility] New Patient Note    CC: Patient presents with:  Shoulder Pain: Right shoulder pain. Injury initally started at young age but recently this summer his pain become a constant and affected his pain.       HPI: The patient is a 75 year ol TABLET ONCE DAILY 90 tablet 3   • omeprazole 20 MG Oral Capsule Delayed Release TAKE 1 CAPSULE BY MOUTH  EVERY MORNING 90 capsule 3       Lisinopril              REACTIVE AIRWAY DISEASE  History reviewed. No pertinent family history.   Social History    Occ migration or acute findings are noted. Assessment/Diagnoses:  Diagnoses and all orders for this visit:    Impingement syndrome of right shoulder  -     XR SHOULDER, COMPLETE (MIN 2 VIEWS), RIGHT (CPT=73030);  Future    Right shoulder pain, unspecified ch

## 2020-11-16 ENCOUNTER — TELEPHONE (OUTPATIENT)
Dept: ORTHOPEDICS CLINIC | Facility: CLINIC | Age: 65
End: 2020-11-16

## 2020-11-16 ENCOUNTER — TELEPHONE (OUTPATIENT)
Dept: FAMILY MEDICINE CLINIC | Facility: CLINIC | Age: 65
End: 2020-11-16

## 2020-11-16 ENCOUNTER — OFFICE VISIT (OUTPATIENT)
Dept: ORTHOPEDICS CLINIC | Facility: CLINIC | Age: 65
End: 2020-11-16
Payer: MEDICARE

## 2020-11-16 DIAGNOSIS — M19.011 PRIMARY OSTEOARTHRITIS OF RIGHT SHOULDER: ICD-10-CM

## 2020-11-16 DIAGNOSIS — M75.121 COMPLETE TEAR OF RIGHT ROTATOR CUFF, UNSPECIFIED WHETHER TRAUMATIC: Primary | ICD-10-CM

## 2020-11-16 DIAGNOSIS — I10 ESSENTIAL HYPERTENSION, BENIGN: ICD-10-CM

## 2020-11-16 DIAGNOSIS — M75.41 IMPINGEMENT SYNDROME OF RIGHT SHOULDER: ICD-10-CM

## 2020-11-16 PROCEDURE — 99214 OFFICE O/P EST MOD 30 MIN: CPT | Performed by: ORTHOPAEDIC SURGERY

## 2020-11-16 RX ORDER — IBUPROFEN 200 MG
200 TABLET ORAL EVERY 6 HOURS PRN
COMMUNITY

## 2020-11-16 RX ORDER — HYDROCHLOROTHIAZIDE 25 MG/1
25 TABLET ORAL DAILY
Qty: 90 TABLET | Refills: 1 | Status: SHIPPED | OUTPATIENT
Start: 2020-11-16 | End: 2021-02-03

## 2020-11-16 NOTE — PROGRESS NOTES
[facility] Follow-up Progress Note      Chief Complaint: Right shoulder pain and weakness      History: The patient is a 78-year-old male who returns for reassessment of his shoulder pain and weakness.   The patient recently underwent an MRI is long discussion regarding the nature of the procedure and the typical postoperative course.     Risk, benefits and alternatives to surgery were discussed including but not limited to possible infection, bleeding, neurovascular injury as well as postop stiff

## 2020-11-16 NOTE — TELEPHONE ENCOUNTER
[facility] received from [facility] requesting a refill of HCTZ. LOV 9/30/2020.    Future Appointments   Date Time Provider Nickie Bustillo   11/25/2020  9:30 AM Shilpi Tapia MD [facility]   1/12/2021  7:30 AM Shilpi Tapia MD [facility]

## 2020-11-17 ENCOUNTER — TELEPHONE (OUTPATIENT)
Dept: FAMILY MEDICINE CLINIC | Facility: CLINIC | Age: 65
End: 2020-11-17

## 2020-11-17 DIAGNOSIS — Z01.818 PRE-OP EXAM: Primary | ICD-10-CM

## 2020-11-17 RX ORDER — ACETAMINOPHEN 500 MG
1000 TABLET ORAL ONCE
Status: CANCELLED | OUTPATIENT
Start: 2020-11-17 | End: 2020-11-17

## 2020-11-17 NOTE — TELEPHONE ENCOUNTER
Patient having surgery 12/01/20 with Dr. Sean Ramos, H&P required. Patient scheduled with Dr. Jose Antoine 11/25/20.  Fax in triage

## 2020-11-17 NOTE — TELEPHONE ENCOUNTER
Received form does not include requested pre-op orders.     Please request orders from Dr Gasper Veloz office    Routed to front staff

## 2020-11-18 NOTE — TELEPHONE ENCOUNTER
Pre Op orders are as follows: EKG and BMP for surgery on 12/1/2020 by Maya Josue. Maddiejatinder Aleah has an Pre Op appointment with Frank Brennan for 11/25/2020. Spoke with Josh Crawford suggesting he have testing done at [facility] before his Pre Op visit.  Gave him [employer]

## 2020-11-19 ENCOUNTER — TELEPHONE (OUTPATIENT)
Dept: ORTHOPEDICS CLINIC | Facility: CLINIC | Age: 65
End: 2020-11-19

## 2020-11-19 NOTE — TELEPHONE ENCOUNTER
Patient called regarding questions pertaining to scheduled surgery on 12-1-2020 for rotator cuff. Would like to discuss with someone to make final decision on whether to proceed.       Judi Claude

## 2020-11-23 ENCOUNTER — APPOINTMENT (OUTPATIENT)
Dept: LAB | Facility: HOSPITAL | Age: 65
End: 2020-11-23
Attending: FAMILY MEDICINE
Payer: MEDICARE

## 2020-11-23 DIAGNOSIS — Z01.818 PRE-OP EXAM: ICD-10-CM

## 2020-11-23 PROCEDURE — 80048 BASIC METABOLIC PNL TOTAL CA: CPT | Performed by: FAMILY MEDICINE

## 2020-11-23 PROCEDURE — 93010 ELECTROCARDIOGRAM REPORT: CPT | Performed by: INTERNAL MEDICINE

## 2020-11-23 PROCEDURE — 36415 COLL VENOUS BLD VENIPUNCTURE: CPT | Performed by: FAMILY MEDICINE

## 2020-11-23 PROCEDURE — 93005 ELECTROCARDIOGRAM TRACING: CPT

## 2020-11-25 ENCOUNTER — OFFICE VISIT (OUTPATIENT)
Dept: FAMILY MEDICINE CLINIC | Facility: CLINIC | Age: 65
End: 2020-11-25
Payer: MEDICARE

## 2020-11-25 VITALS
HEIGHT: 64 IN | OXYGEN SATURATION: 97 % | HEART RATE: 86 BPM | BODY MASS INDEX: 36.64 KG/M2 | DIASTOLIC BLOOD PRESSURE: 74 MMHG | RESPIRATION RATE: 16 BRPM | SYSTOLIC BLOOD PRESSURE: 112 MMHG | WEIGHT: 214.63 LBS | TEMPERATURE: 98 F

## 2020-11-25 DIAGNOSIS — Z01.818 PREOP EXAMINATION: Primary | ICD-10-CM

## 2020-11-25 DIAGNOSIS — R94.31 EKG ABNORMALITIES: ICD-10-CM

## 2020-11-25 DIAGNOSIS — G89.29 CHRONIC RIGHT SHOULDER PAIN: ICD-10-CM

## 2020-11-25 DIAGNOSIS — M25.511 CHRONIC RIGHT SHOULDER PAIN: ICD-10-CM

## 2020-11-25 DIAGNOSIS — E11.9 TYPE 2 DIABETES MELLITUS WITHOUT COMPLICATION, WITHOUT LONG-TERM CURRENT USE OF INSULIN (HCC): ICD-10-CM

## 2020-11-25 DIAGNOSIS — I10 ESSENTIAL HYPERTENSION, BENIGN: ICD-10-CM

## 2020-11-25 DIAGNOSIS — E78.5 HYPERLIPIDEMIA LDL GOAL <100: ICD-10-CM

## 2020-11-25 DIAGNOSIS — M75.121 COMPLETE TEAR OF RIGHT ROTATOR CUFF, UNSPECIFIED WHETHER TRAUMATIC: ICD-10-CM

## 2020-11-25 PROCEDURE — 99214 OFFICE O/P EST MOD 30 MIN: CPT | Performed by: FAMILY MEDICINE

## 2020-11-25 NOTE — PROGRESS NOTES
Patient presents with:  Pre-Op Exam    HPI:   Dora Fry is a 75year old male who is being evaluated for pre-surgical clearance at the request of Dr. Johanny Courtney.    Surgery: R shoulder arthroscopy, rotator cuff repair, subacromial decompression, possible dista Allergies:    Lisinopril              REACTIVE AIRWAY DISEASE    Past Surgical History:   Procedure Laterality Date   • General sleep study  3/10/03   • Hernia surgery     • Knee replacement surgery  11/2014    L knee by Bere   • Other surgical his Alert and oriented X 3, normal strength and tone. Normal symmetric reflexes.  Normal coordination and gait     Sinus rhythm with 1st degree A-V block   Septal infarct , age undetermined   No previous ECGs available   Results for orders placed or performed i hyperglycemia  Watch diet  Will need to hold medications the AM of the surgery. 5. EKG abnormalities  Improved on repeat EKG in office.   Will fax over both EKGs to the anesthesia team.  Better baseline EKG tracing, I suspect this helped the interpretat

## 2020-11-28 ENCOUNTER — APPOINTMENT (OUTPATIENT)
Dept: LAB | Facility: HOSPITAL | Age: 65
End: 2020-11-28
Attending: ORTHOPAEDIC SURGERY
Payer: MEDICARE

## 2020-11-28 DIAGNOSIS — M75.100 ROTATOR CUFF TEAR: ICD-10-CM

## 2020-12-01 ENCOUNTER — HOSPITAL ENCOUNTER (OUTPATIENT)
Facility: HOSPITAL | Age: 65
Setting detail: HOSPITAL OUTPATIENT SURGERY
Discharge: HOME | End: 2020-12-01
Attending: ORTHOPAEDIC SURGERY | Admitting: ORTHOPAEDIC SURGERY
Payer: MEDICARE

## 2020-12-01 ENCOUNTER — ANESTHESIA (OUTPATIENT)
Dept: SURGERY | Facility: HOSPITAL | Age: 65
End: 2020-12-01
Payer: MEDICARE

## 2020-12-01 ENCOUNTER — ANESTHESIA EVENT (OUTPATIENT)
Dept: SURGERY | Facility: HOSPITAL | Age: 65
End: 2020-12-01
Payer: MEDICARE

## 2020-12-01 VITALS
BODY MASS INDEX: 36.02 KG/M2 | WEIGHT: 211 LBS | TEMPERATURE: 98 F | OXYGEN SATURATION: 97 % | HEIGHT: 64 IN | RESPIRATION RATE: 18 BRPM | DIASTOLIC BLOOD PRESSURE: 70 MMHG | HEART RATE: 91 BPM | SYSTOLIC BLOOD PRESSURE: 142 MMHG

## 2020-12-01 DIAGNOSIS — M75.100 ROTATOR CUFF TEAR: Primary | ICD-10-CM

## 2020-12-01 PROCEDURE — 0LQ14ZZ REPAIR RIGHT SHOULDER TENDON, PERCUTANEOUS ENDOSCOPIC APPROACH: ICD-10-PCS | Performed by: ORTHOPAEDIC SURGERY

## 2020-12-01 PROCEDURE — 0RNJ4ZZ RELEASE RIGHT SHOULDER JOINT, PERCUTANEOUS ENDOSCOPIC APPROACH: ICD-10-PCS | Performed by: ORTHOPAEDIC SURGERY

## 2020-12-01 PROCEDURE — 0PB94ZZ EXCISION OF RIGHT CLAVICLE, PERCUTANEOUS ENDOSCOPIC APPROACH: ICD-10-PCS | Performed by: ORTHOPAEDIC SURGERY

## 2020-12-01 PROCEDURE — 82962 GLUCOSE BLOOD TEST: CPT

## 2020-12-01 PROCEDURE — 76942 ECHO GUIDE FOR BIOPSY: CPT | Performed by: ANESTHESIOLOGY

## 2020-12-01 DEVICE — PUSHLOCK BIO 3.5 AR-1926BC: Type: IMPLANTABLE DEVICE | Site: KNEE | Status: FUNCTIONAL

## 2020-12-01 RX ORDER — BUPIVACAINE HYDROCHLORIDE 2.5 MG/ML
INJECTION, SOLUTION EPIDURAL; INFILTRATION; INTRACAUDAL AS NEEDED
Status: DISCONTINUED | OUTPATIENT
Start: 2020-12-01 | End: 2020-12-01 | Stop reason: SURG

## 2020-12-01 RX ORDER — DEXTROSE MONOHYDRATE 25 G/50ML
50 INJECTION, SOLUTION INTRAVENOUS
Status: DISCONTINUED | OUTPATIENT
Start: 2020-12-01 | End: 2020-12-01 | Stop reason: HOSPADM

## 2020-12-01 RX ORDER — CEFAZOLIN SODIUM/WATER 2 G/20 ML
2 SYRINGE (ML) INTRAVENOUS ONCE
Status: COMPLETED | OUTPATIENT
Start: 2020-12-01 | End: 2020-12-01

## 2020-12-01 RX ORDER — DEXTROSE MONOHYDRATE 25 G/50ML
50 INJECTION, SOLUTION INTRAVENOUS
Status: DISCONTINUED | OUTPATIENT
Start: 2020-12-01 | End: 2020-12-01

## 2020-12-01 RX ORDER — NALOXONE HYDROCHLORIDE 0.4 MG/ML
80 INJECTION, SOLUTION INTRAMUSCULAR; INTRAVENOUS; SUBCUTANEOUS AS NEEDED
Status: DISCONTINUED | OUTPATIENT
Start: 2020-12-01 | End: 2020-12-01

## 2020-12-01 RX ORDER — MIDAZOLAM HYDROCHLORIDE 1 MG/ML
INJECTION INTRAMUSCULAR; INTRAVENOUS AS NEEDED
Status: DISCONTINUED | OUTPATIENT
Start: 2020-12-01 | End: 2020-12-01 | Stop reason: SURG

## 2020-12-01 RX ORDER — MIDAZOLAM HYDROCHLORIDE 1 MG/ML
1 INJECTION INTRAMUSCULAR; INTRAVENOUS EVERY 5 MIN PRN
Status: DISCONTINUED | OUTPATIENT
Start: 2020-12-01 | End: 2020-12-01

## 2020-12-01 RX ORDER — DEXAMETHASONE SODIUM PHOSPHATE 10 MG/ML
INJECTION, SOLUTION INTRAMUSCULAR; INTRAVENOUS AS NEEDED
Status: DISCONTINUED | OUTPATIENT
Start: 2020-12-01 | End: 2020-12-01 | Stop reason: SURG

## 2020-12-01 RX ORDER — HYDROCODONE BITARTRATE AND ACETAMINOPHEN 5; 325 MG/1; MG/1
2 TABLET ORAL AS NEEDED
Status: DISCONTINUED | OUTPATIENT
Start: 2020-12-01 | End: 2020-12-01

## 2020-12-01 RX ORDER — ACETAMINOPHEN 500 MG
1000 TABLET ORAL ONCE AS NEEDED
Status: DISCONTINUED | OUTPATIENT
Start: 2020-12-01 | End: 2020-12-01

## 2020-12-01 RX ORDER — ONDANSETRON 2 MG/ML
INJECTION INTRAMUSCULAR; INTRAVENOUS AS NEEDED
Status: DISCONTINUED | OUTPATIENT
Start: 2020-12-01 | End: 2020-12-01 | Stop reason: SURG

## 2020-12-01 RX ORDER — HYDROMORPHONE HYDROCHLORIDE 1 MG/ML
0.5 INJECTION, SOLUTION INTRAMUSCULAR; INTRAVENOUS; SUBCUTANEOUS EVERY 5 MIN PRN
Status: DISCONTINUED | OUTPATIENT
Start: 2020-12-01 | End: 2020-12-01

## 2020-12-01 RX ORDER — BUPRENORPHINE HYDROCHLORIDE 0.32 MG/ML
INJECTION INTRAMUSCULAR; INTRAVENOUS AS NEEDED
Status: DISCONTINUED | OUTPATIENT
Start: 2020-12-01 | End: 2020-12-01 | Stop reason: SURG

## 2020-12-01 RX ORDER — MEPERIDINE HYDROCHLORIDE 25 MG/ML
25 INJECTION INTRAMUSCULAR; INTRAVENOUS; SUBCUTANEOUS
Status: DISCONTINUED | OUTPATIENT
Start: 2020-12-01 | End: 2020-12-01

## 2020-12-01 RX ORDER — HYDROCODONE BITARTRATE AND ACETAMINOPHEN 5; 325 MG/1; MG/1
1 TABLET ORAL AS NEEDED
Status: DISCONTINUED | OUTPATIENT
Start: 2020-12-01 | End: 2020-12-01

## 2020-12-01 RX ORDER — SODIUM CHLORIDE, SODIUM LACTATE, POTASSIUM CHLORIDE, CALCIUM CHLORIDE 600; 310; 30; 20 MG/100ML; MG/100ML; MG/100ML; MG/100ML
INJECTION, SOLUTION INTRAVENOUS CONTINUOUS
Status: DISCONTINUED | OUTPATIENT
Start: 2020-12-01 | End: 2020-12-01

## 2020-12-01 RX ORDER — METOCLOPRAMIDE HYDROCHLORIDE 5 MG/ML
INJECTION INTRAMUSCULAR; INTRAVENOUS AS NEEDED
Status: DISCONTINUED | OUTPATIENT
Start: 2020-12-01 | End: 2020-12-01 | Stop reason: SURG

## 2020-12-01 RX ORDER — HYDROCODONE BITARTRATE AND ACETAMINOPHEN 5; 325 MG/1; MG/1
1 TABLET ORAL EVERY 4 HOURS PRN
Qty: 30 TABLET | Refills: 0 | Status: SHIPPED | OUTPATIENT
Start: 2020-12-01 | End: 2021-02-03

## 2020-12-01 RX ORDER — ACETAMINOPHEN 500 MG
1000 TABLET ORAL EVERY 6 HOURS PRN
Status: ON HOLD | COMMUNITY
End: 2020-12-01

## 2020-12-01 RX ORDER — ONDANSETRON 2 MG/ML
4 INJECTION INTRAMUSCULAR; INTRAVENOUS AS NEEDED
Status: DISCONTINUED | OUTPATIENT
Start: 2020-12-01 | End: 2020-12-01

## 2020-12-01 RX ADMIN — METOCLOPRAMIDE HYDROCHLORIDE 10 MG: 5 INJECTION INTRAMUSCULAR; INTRAVENOUS at 12:00:00

## 2020-12-01 RX ADMIN — MIDAZOLAM HYDROCHLORIDE 4 MG: 1 INJECTION INTRAMUSCULAR; INTRAVENOUS at 10:26:00

## 2020-12-01 RX ADMIN — SODIUM CHLORIDE, SODIUM LACTATE, POTASSIUM CHLORIDE, CALCIUM CHLORIDE: 600; 310; 30; 20 INJECTION, SOLUTION INTRAVENOUS at 13:10:00

## 2020-12-01 RX ADMIN — BUPRENORPHINE HYDROCHLORIDE 150 MCG: 0.32 INJECTION INTRAMUSCULAR; INTRAVENOUS at 10:33:00

## 2020-12-01 RX ADMIN — SODIUM CHLORIDE, SODIUM LACTATE, POTASSIUM CHLORIDE, CALCIUM CHLORIDE: 600; 310; 30; 20 INJECTION, SOLUTION INTRAVENOUS at 13:27:00

## 2020-12-01 RX ADMIN — ONDANSETRON 4 MG: 2 INJECTION INTRAMUSCULAR; INTRAVENOUS at 13:16:00

## 2020-12-01 RX ADMIN — CEFAZOLIN SODIUM/WATER 2 G: 2 G/20 ML SYRINGE (ML) INTRAVENOUS at 10:27:00

## 2020-12-01 RX ADMIN — DEXAMETHASONE SODIUM PHOSPHATE 2 MG: 10 INJECTION, SOLUTION INTRAMUSCULAR; INTRAVENOUS at 10:33:00

## 2020-12-01 RX ADMIN — BUPIVACAINE HYDROCHLORIDE 20 ML: 2.5 INJECTION, SOLUTION EPIDURAL; INFILTRATION; INTRACAUDAL at 10:33:00

## 2020-12-01 NOTE — ANESTHESIA PREPROCEDURE EVALUATION
PRE-OP EVALUATION    Patient Name: Martina Rob    Pre-op Diagnosis: ROTATOR CUFF TEAR, OSTEOARTHRITIS, IMPINGEMENT    Procedure(s):  RIGHT SHOULDER ARTHROSCOPY WITH SUBACROMIAL DECOMPRESSION, DISTAL CLAVICLE RESECTION, ROTATOR CUFF REPAIR    Surgeon(s) an 11/30/2020 at 2230    •  glimepiride 2 MG Oral Tab, TAKE 1 TABLET DAILY WITH   BREAKFAST, Disp: 90 tablet, Rfl: 3, 11/30/2020 at 2230    •  Fenofibrate 145 MG Oral Tab, TAKE 1 TABLET ONCE DAILY, Disp: 90 tablet, Rfl: 3, 11/30/2020 at 2230    •  omeprazole Airway      Mallampati: III  Mouth opening: 3 FB  TM distance: 4 - 6 cm  Neck ROM: full Cardiovascular    Cardiovascular exam normal.         Dental    No notable dental history.          Pulmonary                     Other findings            AS

## 2020-12-01 NOTE — ANESTHESIA PROCEDURE NOTES
[facility]  Performed by: Teodora Guillen MD  Authorized by: Teodora Guillen MD       General Information and Staff    Start Time:  12/1/2020 9:28 AM  End Time:  12/1/2020 9:33 AM  Anesthesiologist:  Ethel Jeffers MD  Performed by:   Anesthesiologist

## 2020-12-01 NOTE — ANESTHESIA POSTPROCEDURE EVALUATION
Natividad Fajardo [address] Patient Status:  Hospital Outpatient Surgery   Age/Gender 75year old male MRN [id number]   [facility] SURGERY Attending Anila Kirkpatrick, [address] [facility] [address] 0 PCP Sasha Oliveira MD       Anesthesia Post-op Note    P

## 2020-12-01 NOTE — H&P
Orthopaedic H&P Update    H&P performed by Dr Kenn Helm on 11/25/20 was reviewed by Maria Elena Mcqueen MD on 12/1/2020, the patient was examined and no significant changes have occurred in the patient's condition since the H&P was performed.   Risks and benefits wer

## 2020-12-01 NOTE — BRIEF OP NOTE
Pre-Operative Diagnosis: RIGHT SHOULDER ROTATOR CUFF TEAR, OSTEOARTHRITIS, IMPINGEMENT     Post-Operative Diagnosis: RIGHT SHOULDER ROTATOR CUFF TEAR, IMPINGEMENT SYNDROME, AC JOINT ARTHROSIS, LABRAL TEAR, AND PROXIMAL BICEPS TENDON TEAR      Procedure Per

## 2020-12-01 NOTE — OPERATIVE REPORT
[facility]    PATIENT'S NAME: Elif Thor   ATTENDING PHYSICIAN: Ashley Payne M.D. OPERATING PHYSICIAN: Ashley Payne M.D.    PATIENT ACCOUNT#:   [de-identified]    LOCATION:  PREOPASCC  PRE ASCC 1 EDWP 10  MEDICAL RECORD #:   [id number]       DATE OF BI significant late degenerative glenohumeral changes were seen. We had a long discussion regarding his diagnosis and treatment options. In light of his prolonged symptoms and nocturnal pain as well as weakness, a rotator cuff repair was offered.   The risks superiorly where we could see directly into the subacromial space through a large retracted rotator cuff tear. There was evidence of an avulsed long-head of the biceps at the supraglenoid tubercle with biceps tendon stump noted.   A spinal needle was used a 4.0 barrel-tipped bur using the lateral cutting block technique. This was carried out to the [facility] joint facet where there was a very large distal head of the clavicle which was eburnated and without articular cartilage.   It impinged at the myotendinous ju Subdeltoid space was evacuated and the portals were reapproximated with 2-0 nylon in a simple fashion. A sterile nonadhesive dressing was applied using Betadine ointment, Adaptic fluffs, ABDs, and Microfoam tape.   An abduction pillow and sling was applied

## 2020-12-01 NOTE — ANESTHESIA PROCEDURE NOTES
Airway  Date/Time: 12/1/2020 9:34 AM  Urgency: elective    Airway not difficult    General Information and Staff    Patient location during procedure: OR  Anesthesiologist: Natividad Guillen MD  Performed: anesthesiologist     Indications and Patient Conditi

## 2020-12-02 ENCOUNTER — TELEPHONE (OUTPATIENT)
Dept: ORTHOPEDICS CLINIC | Facility: CLINIC | Age: 65
End: 2020-12-02

## 2020-12-04 ENCOUNTER — TELEPHONE (OUTPATIENT)
Dept: ORTHOPEDICS CLINIC | Facility: CLINIC | Age: 65
End: 2020-12-04

## 2020-12-04 DIAGNOSIS — E11.9 TYPE 2 DIABETES MELLITUS WITHOUT COMPLICATION, WITHOUT LONG-TERM CURRENT USE OF INSULIN (HCC): ICD-10-CM

## 2020-12-04 RX ORDER — HYDROCODONE BITARTRATE AND ACETAMINOPHEN 5; 325 MG/1; MG/1
1 TABLET ORAL EVERY 8 HOURS PRN
Qty: 30 TABLET | Refills: 0 | Status: SHIPPED | OUTPATIENT
Start: 2020-12-04 | End: 2021-02-03

## 2020-12-07 NOTE — TELEPHONE ENCOUNTER
Pt is changing pharmacies in the new year but needs the [facility]amepiride [facility]illed [facility] now. He has 2-3 weeks worth at this time.

## 2020-12-08 RX ORDER — GLIMEPIRIDE 2 MG/1
TABLET ORAL
Qty: 90 TABLET | Refills: 0 | Status: SHIPPED | OUTPATIENT
Start: 2020-12-08 | End: 2021-02-03

## 2020-12-10 ENCOUNTER — OFFICE VISIT (OUTPATIENT)
Dept: ORTHOPEDICS CLINIC | Facility: CLINIC | Age: 65
End: 2020-12-10
Payer: MEDICARE

## 2020-12-10 DIAGNOSIS — Z98.890 S/P RIGHT ROTATOR CUFF REPAIR: Primary | ICD-10-CM

## 2020-12-10 PROCEDURE — 99024 POSTOP FOLLOW-UP VISIT: CPT | Performed by: ORTHOPAEDIC SURGERY

## 2020-12-10 NOTE — PROGRESS NOTES
[facility] Post-op Progress Note        Date of Surgery: 12/01/20        History: The patient is a 75year old male status post shoulder arthroscopy and rotator cuff repair approximately 10 days ago.   Patient reports no complications following The dictation was partially prepared using Kobo voice recognition software. Errors may occur, which have been corrected where identified.

## 2020-12-15 ENCOUNTER — OFFICE VISIT (OUTPATIENT)
Dept: PHYSICAL THERAPY | Age: 65
End: 2020-12-15
Attending: ORTHOPAEDIC SURGERY
Payer: MEDICARE

## 2020-12-15 DIAGNOSIS — Z98.890 S/P RIGHT ROTATOR CUFF REPAIR: ICD-10-CM

## 2020-12-15 PROCEDURE — 97110 THERAPEUTIC EXERCISES: CPT

## 2020-12-15 PROCEDURE — 97161 PT EVAL LOW COMPLEX 20 MIN: CPT

## 2020-12-15 NOTE — PROGRESS NOTES
POST-OP SHOULDER EVALUATION:   Referring Physician: Dr. Brandon Anderson  Diagnosis:  S/P right rotator cuff repair, labral debridement, distal clavicle resection (W62.764) 12/1/2020 Date of Service: 12/15/2020     PATIENT SUMMARY   Cornelius West is a 75year old male findings, pathology, POC and surgical protocol and progression. Tolerated initial tx of PROM well. Treatment will progress per surgical protocol.   Skilled Physical Therapy is medically necessary to address the above impairments and reach functional goals hair  · Pt will increase shoulder AROM ABD/ER to  to reach and fasten seatbelt   · Pt will increase shoulder AROM IR to 50 to be able to reach in back pocket, tuck in shirt, and turn steering wheel without pain  · Pt will improve shoulder strength througho

## 2020-12-17 ENCOUNTER — OFFICE VISIT (OUTPATIENT)
Dept: PHYSICAL THERAPY | Age: 65
End: 2020-12-17
Attending: ORTHOPAEDIC SURGERY
Payer: MEDICARE

## 2020-12-17 PROCEDURE — 97140 MANUAL THERAPY 1/> REGIONS: CPT

## 2020-12-17 PROCEDURE — 97110 THERAPEUTIC EXERCISES: CPT

## 2020-12-17 NOTE — PROGRESS NOTES
Diagnosis: S/P right rotator cuff repair,IS and SS,  labral debridement, distal clavicle resection (B87.727) 12/1/2020      Precautions: No IR or horizontal add x 6 weeks post op    Adductor pillow d/c 12/20/2020   Can d/c sling beginning 12/27/2020 at ear in back pocket, tuck in shirt, and turn steering wheel without pain  · Pt will improve shoulder strength throughout to 4/5 to improve function with ADLs including all dressing and bathing w/o restrictions difficulty.   · Pt will demonstrate increased mid/lo

## 2020-12-22 ENCOUNTER — OFFICE VISIT (OUTPATIENT)
Dept: PHYSICAL THERAPY | Age: 65
End: 2020-12-22
Attending: ORTHOPAEDIC SURGERY
Payer: MEDICARE

## 2020-12-22 PROCEDURE — 97140 MANUAL THERAPY 1/> REGIONS: CPT

## 2020-12-22 PROCEDURE — 97110 THERAPEUTIC EXERCISES: CPT

## 2020-12-22 NOTE — PROGRESS NOTES
Diagnosis: S/P right rotator cuff repair,IS and SS,  labral debridement, distal clavicle resection (M41.084)    12/1/2020      Precautions: No IR or horizontal add x 6 weeks post op    Adductor pillow d/c 12/20/2020   Can d/c sling beginning 12/27/2020 at and wash hair  · Pt will increase shoulder AROM ABD/ER to  to reach and fasten seatbelt   · Pt will increase shoulder AROM IR to 50 to be able to reach in back pocket, tuck in shirt, and turn steering wheel without pain  · Pt will improve shoulder strength

## 2020-12-29 ENCOUNTER — OFFICE VISIT (OUTPATIENT)
Dept: PHYSICAL THERAPY | Age: 65
End: 2020-12-29
Attending: ORTHOPAEDIC SURGERY
Payer: MEDICARE

## 2020-12-29 PROCEDURE — 97140 MANUAL THERAPY 1/> REGIONS: CPT

## 2020-12-29 PROCEDURE — 97110 THERAPEUTIC EXERCISES: CPT

## 2020-12-29 NOTE — PROGRESS NOTES
Diagnosis: S/P right rotator cuff repair,IS and SS,  labral debridement, distal clavicle resection (P87.089)    12/1/2020      Precautions: No IR or horizontal add x 6 weeks post op    Adductor pillow d/c 12/20/2020   Can d/c sling beginning 12/27/2020 at degrees to be able to reach into overhead cabinets without pain or restriction  · Pt will improve shoulder abduction AROM to >150 degrees to improve ability to don deodorant, don/doff shirts, and wash hair  · Pt will increase shoulder AROM ABD/ER to  to re

## 2021-01-04 ENCOUNTER — OFFICE VISIT (OUTPATIENT)
Dept: PHYSICAL THERAPY | Age: 66
End: 2021-01-04
Attending: ORTHOPAEDIC SURGERY
Payer: MEDICARE

## 2021-01-04 ENCOUNTER — OFFICE VISIT (OUTPATIENT)
Dept: ORTHOPEDICS CLINIC | Facility: CLINIC | Age: 66
End: 2021-01-04
Payer: MEDICARE

## 2021-01-04 DIAGNOSIS — Z48.89 AFTERCARE FOLLOWING SURGERY: Primary | ICD-10-CM

## 2021-01-04 DIAGNOSIS — Z98.890 S/P ROTATOR CUFF REPAIR: ICD-10-CM

## 2021-01-04 PROCEDURE — 99024 POSTOP FOLLOW-UP VISIT: CPT | Performed by: ORTHOPAEDIC SURGERY

## 2021-01-04 PROCEDURE — 97110 THERAPEUTIC EXERCISES: CPT

## 2021-01-04 PROCEDURE — 97140 MANUAL THERAPY 1/> REGIONS: CPT

## 2021-01-04 NOTE — PROGRESS NOTES
Diagnosis: S/P right rotator cuff repair,IS and SS,  labral debridement, distal clavicle resection (Q45.554)    12/1/2020      Precautions: No IR or horizontal add x 6 weeks post op    No PRE's 3-4 months post op.    Medicare     Treatment Number: 6   PD 57 IR to 50 to be able to reach in back pocket, tuck in shirt, and turn steering wheel without pain  · Pt will improve shoulder strength throughout to 4/5 to improve function with ADLs including all dressing and bathing w/o restrictions difficulty.   · Pt will

## 2021-01-06 ENCOUNTER — APPOINTMENT (OUTPATIENT)
Dept: PHYSICAL THERAPY | Age: 66
End: 2021-01-06
Attending: ORTHOPAEDIC SURGERY
Payer: MEDICARE

## 2021-01-07 ENCOUNTER — OFFICE VISIT (OUTPATIENT)
Dept: PHYSICAL THERAPY | Age: 66
End: 2021-01-07
Attending: ORTHOPAEDIC SURGERY
Payer: MEDICARE

## 2021-01-07 PROCEDURE — 97110 THERAPEUTIC EXERCISES: CPT

## 2021-01-07 PROCEDURE — 97140 MANUAL THERAPY 1/> REGIONS: CPT

## 2021-01-07 NOTE — PROGRESS NOTES
Diagnosis: S/P right rotator cuff repair,IS and SS,  labral debridement, distal clavicle resection (P22.634)    12/1/2020      Precautions: No IR or horizontal add x 6 weeks post op    No PRE's 3-4 months post op.    Medicare     Treatment Number: 3   YA 74 hair  · Pt will increase shoulder AROM ABD/ER to  to reach and fasten seatbelt   · Pt will increase shoulder AROM IR to 50 to be able to reach in back pocket, tuck in shirt, and turn steering wheel without pain  · Pt will improve shoulder strength througho

## 2021-01-11 ENCOUNTER — APPOINTMENT (OUTPATIENT)
Dept: PHYSICAL THERAPY | Age: 66
End: 2021-01-11
Attending: ORTHOPAEDIC SURGERY
Payer: MEDICARE

## 2021-01-12 ENCOUNTER — OFFICE VISIT (OUTPATIENT)
Dept: PHYSICAL THERAPY | Age: 66
End: 2021-01-12
Attending: ORTHOPAEDIC SURGERY
Payer: MEDICARE

## 2021-01-12 PROCEDURE — 97110 THERAPEUTIC EXERCISES: CPT

## 2021-01-12 PROCEDURE — 97140 MANUAL THERAPY 1/> REGIONS: CPT

## 2021-01-12 NOTE — PROGRESS NOTES
Diagnosis: S/P right rotator cuff repair,IS and SS,  labral debridement, distal clavicle resection (X84.700)    12/1/2020      Precautions: No IR or horizontal add x 6 weeks post op    No PRE's 3-4 months post op.    Medicare     Treatment Number: 7   of 16 improved ability to sleep without waking due to shoulder pain  · Pt will improve shoulder flexion AROM to >160 degrees to be able to reach into overhead cabinets without pain or restriction  · Pt will improve shoulder abduction AROM to >150 degrees to impr

## 2021-01-13 ENCOUNTER — OFFICE VISIT (OUTPATIENT)
Dept: PHYSICAL THERAPY | Age: 66
End: 2021-01-13
Attending: ORTHOPAEDIC SURGERY
Payer: MEDICARE

## 2021-01-13 PROCEDURE — 97140 MANUAL THERAPY 1/> REGIONS: CPT

## 2021-01-13 PROCEDURE — 97110 THERAPEUTIC EXERCISES: CPT

## 2021-01-13 NOTE — PROGRESS NOTES
Diagnosis: S/P right rotator cuff repair,IS and SS,  labral debridement, distal clavicle resection (M44.886)    12/1/2020      Precautions:    No PRE's 3-4 months post op.    Medicare     Treatment Number: 8    of 16    Subjective:   Doing HEP as asked, car shoulder abduction AROM to >150 degrees to improve ability to don deodorant, don/doff shirts, and wash hair  · Pt will increase shoulder AROM ABD/ER to  to reach and fasten seatbelt   · Pt will increase shoulder AROM IR to 50 to be able to reach in back po

## 2021-01-18 ENCOUNTER — APPOINTMENT (OUTPATIENT)
Dept: PHYSICAL THERAPY | Age: 66
End: 2021-01-18
Attending: ORTHOPAEDIC SURGERY
Payer: MEDICARE

## 2021-01-20 ENCOUNTER — APPOINTMENT (OUTPATIENT)
Dept: PHYSICAL THERAPY | Age: 66
End: 2021-01-20
Attending: ORTHOPAEDIC SURGERY
Payer: MEDICARE

## 2021-01-25 ENCOUNTER — APPOINTMENT (OUTPATIENT)
Dept: PHYSICAL THERAPY | Age: 66
End: 2021-01-25
Attending: ORTHOPAEDIC SURGERY
Payer: MEDICARE

## 2021-01-27 ENCOUNTER — APPOINTMENT (OUTPATIENT)
Dept: PHYSICAL THERAPY | Age: 66
End: 2021-01-27
Attending: ORTHOPAEDIC SURGERY
Payer: MEDICARE

## 2021-02-01 ENCOUNTER — APPOINTMENT (OUTPATIENT)
Dept: PHYSICAL THERAPY | Age: 66
End: 2021-02-01
Attending: ORTHOPAEDIC SURGERY
Payer: MEDICARE

## 2021-02-03 ENCOUNTER — OFFICE VISIT (OUTPATIENT)
Dept: PHYSICAL THERAPY | Age: 66
End: 2021-02-03
Attending: ORTHOPAEDIC SURGERY
Payer: MEDICARE

## 2021-02-03 ENCOUNTER — TELEPHONE (OUTPATIENT)
Dept: FAMILY MEDICINE CLINIC | Facility: CLINIC | Age: 66
End: 2021-02-03

## 2021-02-03 DIAGNOSIS — E11.9 TYPE 2 DIABETES MELLITUS WITHOUT COMPLICATION, WITHOUT LONG-TERM CURRENT USE OF INSULIN (HCC): ICD-10-CM

## 2021-02-03 DIAGNOSIS — Z23 NEED FOR VACCINATION: ICD-10-CM

## 2021-02-03 DIAGNOSIS — K21.9 GASTROESOPHAGEAL REFLUX DISEASE: ICD-10-CM

## 2021-02-03 DIAGNOSIS — I10 ESSENTIAL HYPERTENSION, BENIGN: ICD-10-CM

## 2021-02-03 DIAGNOSIS — E78.5 HYPERLIPIDEMIA LDL GOAL <100: ICD-10-CM

## 2021-02-03 PROCEDURE — 97140 MANUAL THERAPY 1/> REGIONS: CPT

## 2021-02-03 PROCEDURE — 97110 THERAPEUTIC EXERCISES: CPT

## 2021-02-03 RX ORDER — GLIMEPIRIDE 2 MG/1
TABLET ORAL
Qty: 90 TABLET | Refills: 0 | Status: SHIPPED | OUTPATIENT
Start: 2021-02-03 | End: 2021-03-29

## 2021-02-03 RX ORDER — HYDROCHLOROTHIAZIDE 25 MG/1
25 TABLET ORAL DAILY
Qty: 90 TABLET | Refills: 1 | Status: SHIPPED | OUTPATIENT
Start: 2021-02-03 | End: 2021-07-14

## 2021-02-03 RX ORDER — OMEPRAZOLE 20 MG/1
CAPSULE, DELAYED RELEASE ORAL
Qty: 90 CAPSULE | Refills: 3 | Status: SHIPPED | OUTPATIENT
Start: 2021-02-03

## 2021-02-03 RX ORDER — FENOFIBRATE 145 MG/1
TABLET, COATED ORAL
Qty: 90 TABLET | Refills: 3 | Status: SHIPPED | OUTPATIENT
Start: 2021-02-03

## 2021-02-03 RX ORDER — LOSARTAN POTASSIUM 100 MG/1
100 TABLET ORAL
Qty: 90 TABLET | Refills: 3 | Status: SHIPPED | OUTPATIENT
Start: 2021-02-03

## 2021-02-03 NOTE — PROGRESS NOTES
Diagnosis: S/P right rotator cuff repair,IS and SS,  labral debridement, distal clavicle resection (J49.241)    12/1/2020      Precautions:    No PRE's 3-4 months post op.    Medicare     Treatment Number: 9  of 16    Subjective:   Pt returns after being ou AROM. Program addressing goals 2,3,4,7.        PROM/AROM  Flexion: R 170/ 155   Abduction: R 150/140   ER: R 60 neutral    ABD/ER 85  IR: R 50            Goals: (To be met in 16 visits)   · Pt will report improved ability to sleep without waking due to shou

## 2021-02-03 NOTE — TELEPHONE ENCOUNTER
Patient has switched mail order pharmacies and is need of new prescriptions to be sent.  Patient requesting 90 day refills on all meds    Glimepiride  Hydrochlorothiazide  Januvia  Metformin  Losartan  Fenofibrate  Omeprazole    [facility]

## 2021-02-04 ENCOUNTER — IMMUNIZATION (OUTPATIENT)
Dept: LAB | Age: 66
End: 2021-02-04
Attending: HOSPITALIST
Payer: MEDICARE

## 2021-02-04 DIAGNOSIS — Z23 NEED FOR VACCINATION: Primary | ICD-10-CM

## 2021-02-04 PROCEDURE — 0001A SARSCOV2 VAC 30MCG/0.3ML IM: CPT

## 2021-02-08 ENCOUNTER — OFFICE VISIT (OUTPATIENT)
Dept: PHYSICAL THERAPY | Age: 66
End: 2021-02-08
Attending: ORTHOPAEDIC SURGERY
Payer: MEDICARE

## 2021-02-08 PROCEDURE — 97140 MANUAL THERAPY 1/> REGIONS: CPT

## 2021-02-08 PROCEDURE — 97110 THERAPEUTIC EXERCISES: CPT

## 2021-02-08 NOTE — PROGRESS NOTES
Diagnosis: S/P right rotator cuff repair,IS and SS,  labral debridement, distal clavicle resection (Q60.620)    12/1/2020      Precautions:    No PRE's 3-4 months post op.    Medicare     Treatment Number: 10  of 16    Subjective:   Reports some soreness an will improve shoulder flexion AROM to >160 degrees to be able to reach into overhead cabinets without pain or restriction  · Pt will improve shoulder abduction AROM to >150 degrees to improve ability to don deodorant, don/doff shirts, and wash hair  · Pt w

## 2021-02-10 ENCOUNTER — OFFICE VISIT (OUTPATIENT)
Dept: PHYSICAL THERAPY | Age: 66
End: 2021-02-10
Attending: ORTHOPAEDIC SURGERY
Payer: MEDICARE

## 2021-02-10 PROCEDURE — 97140 MANUAL THERAPY 1/> REGIONS: CPT

## 2021-02-10 PROCEDURE — 97110 THERAPEUTIC EXERCISES: CPT

## 2021-02-10 NOTE — PROGRESS NOTES
Diagnosis: S/P right rotator cuff repair,IS and SS,  labral debridement, distal clavicle resection (L09.497)    12/1/2020      Precautions:    No PRE's 3-4 months post op.    Medicare     Physical Therpay Progress Report  Treatment Number: 12 of 16    Subj but is steadily improving.   Strength globally is 4-/5 to 4/5 with scapular muscles weakest.    Recommend to continue therapy for his 4 remaining sessions to further progress HEP, resolve remaining deficits at end range of motion, further improve strength,r

## 2021-02-11 ENCOUNTER — OFFICE VISIT (OUTPATIENT)
Dept: ORTHOPEDICS CLINIC | Facility: CLINIC | Age: 66
End: 2021-02-11
Payer: MEDICARE

## 2021-02-11 DIAGNOSIS — Z48.89 AFTERCARE FOLLOWING SURGERY: ICD-10-CM

## 2021-02-11 DIAGNOSIS — Z98.890 S/P ROTATOR CUFF REPAIR: Primary | ICD-10-CM

## 2021-02-11 PROCEDURE — 99024 POSTOP FOLLOW-UP VISIT: CPT | Performed by: ORTHOPAEDIC SURGERY

## 2021-02-11 NOTE — PROGRESS NOTES
[facility] Post-op Progress Note      Date of Surgery: 12/1/2020      History: Mr. Belgica Ruiz is a 47-year-old male who presents for postop follow-up approximately 2-1/2 months after undergoing rotator cuff repair to the right shoulder.   Mild ache i

## 2021-02-16 ENCOUNTER — OFFICE VISIT (OUTPATIENT)
Dept: PHYSICAL THERAPY | Age: 66
End: 2021-02-16
Attending: ORTHOPAEDIC SURGERY
Payer: MEDICARE

## 2021-02-16 PROCEDURE — 97110 THERAPEUTIC EXERCISES: CPT

## 2021-02-16 PROCEDURE — 97140 MANUAL THERAPY 1/> REGIONS: CPT

## 2021-02-16 NOTE — PROGRESS NOTES
Diagnosis: S/P right rotator cuff repair,IS and SS,  labral debridement, distal clavicle resection (X03.557)    12/1/2020      Precautions:    No PRE's 3-4 months post op.    Medicare     Treatment Number: 13  of 16    Subjective:   Reports previous anterio improved ability to sleep without waking due to shoulder pain  Met  · Pt will improve shoulder flexion AROM to >160 degrees to be able to reach into overhead cabinets without pain or restriction  Met  · Pt will improve shoulder abduction AROM to >150 degre

## 2021-02-16 NOTE — PROGRESS NOTES
Diagnosis: S/P right rotator cuff repair,IS and SS,  labral debridement, distal clavicle resection (X94.041)    12/1/2020      Precautions:    No PRE's 3-4 months post op.    Medicare     Physical Therpay Progress Report  Treatment Number: 12  of 16    Subj but is steadily improving.   Strength globally is 4-/5 to 4/5 with scapular muscles weakest.    Recommend to continue therapy for his 4 remaining sessions to further progress HEP, resolve remaining deficits at end range of motion, further improve strength,r

## 2021-02-26 ENCOUNTER — IMMUNIZATION (OUTPATIENT)
Dept: LAB | Age: 66
End: 2021-02-26
Attending: HOSPITALIST
Payer: MEDICARE

## 2021-02-26 DIAGNOSIS — Z23 NEED FOR VACCINATION: Primary | ICD-10-CM

## 2021-02-26 PROCEDURE — 0002A SARSCOV2 VAC 30MCG/0.3ML IM: CPT

## 2021-03-01 ENCOUNTER — OFFICE VISIT (OUTPATIENT)
Dept: PHYSICAL THERAPY | Age: 66
End: 2021-03-01
Attending: ORTHOPAEDIC SURGERY
Payer: MEDICARE

## 2021-03-01 PROCEDURE — 97140 MANUAL THERAPY 1/> REGIONS: CPT

## 2021-03-01 PROCEDURE — 97110 THERAPEUTIC EXERCISES: CPT

## 2021-03-01 NOTE — PROGRESS NOTES
Diagnosis: S/P right rotator cuff repair,IS and SS,  labral debridement, distal clavicle resection (H79.487)    12/1/2020      Precautions:    No PRE's 3-4 months post op.    Medicare     Treatment Number: 14  of 16    Subjective:   Still has intermittent R overhead cabinets without pain or restriction  Met  · Pt will improve shoulder abduction AROM to >150 degrees to improve ability to don deodorant, don/doff shirts, and wash hair  Met  · Pt will increase shoulder AROM ABD/ER  to reach and fasten seatbelt  M

## 2021-03-03 ENCOUNTER — OFFICE VISIT (OUTPATIENT)
Dept: PHYSICAL THERAPY | Age: 66
End: 2021-03-03
Attending: ORTHOPAEDIC SURGERY
Payer: MEDICARE

## 2021-03-03 LAB
BUN/CREATININE RATIO: 25 (CALC) (ref 6–22)
BUN: 31 MG/DL (ref 7–25)
CALCIUM: 10.4 MG/DL (ref 8.6–10.3)
CARBON DIOXIDE: 29 MMOL/L (ref 20–32)
CHLORIDE: 99 MMOL/L (ref 98–110)
CREATININE: 1.23 MG/DL (ref 0.7–1.25)
EGFR IF AFRICN AM: 70 ML/MIN/1.73M2
EGFR IF NONAFRICN AM: 60 ML/MIN/1.73M2
GLUCOSE: 194 MG/DL (ref 65–99)
POTASSIUM: 4.6 MMOL/L (ref 3.5–5.3)
SODIUM: 137 MMOL/L (ref 135–146)

## 2021-03-03 PROCEDURE — 97110 THERAPEUTIC EXERCISES: CPT

## 2021-03-03 PROCEDURE — 97140 MANUAL THERAPY 1/> REGIONS: CPT

## 2021-03-03 NOTE — PROGRESS NOTES
Diagnosis: S/P right rotator cuff repair,IS and SS,  labral debridement, distal clavicle resection (P06.444)    12/1/2020      Precautions:    No PRE's 3-4 months post op. Medicare     Treatment Number: 15  of 16    Subjective:    Intermittent anterior sh shirts, and wash hair  Met  · Pt will increase shoulder AROM ABD/ER  to reach and fasten seatbelt  Met  · Pt will increase shoulder AROM IR to 50 to be able to reach in back pocket, tuck in shirt, and turn steering wheel without pain  In progress  · Pt jaleesa

## 2021-03-08 ENCOUNTER — APPOINTMENT (OUTPATIENT)
Dept: PHYSICAL THERAPY | Age: 66
End: 2021-03-08
Attending: ORTHOPAEDIC SURGERY
Payer: MEDICARE

## 2021-03-10 ENCOUNTER — OFFICE VISIT (OUTPATIENT)
Dept: PHYSICAL THERAPY | Age: 66
End: 2021-03-10
Attending: ORTHOPAEDIC SURGERY
Payer: MEDICARE

## 2021-03-10 ENCOUNTER — OFFICE VISIT (OUTPATIENT)
Dept: FAMILY MEDICINE CLINIC | Facility: CLINIC | Age: 66
End: 2021-03-10
Payer: MEDICARE

## 2021-03-10 VITALS
HEIGHT: 64.5 IN | SYSTOLIC BLOOD PRESSURE: 134 MMHG | TEMPERATURE: 98 F | DIASTOLIC BLOOD PRESSURE: 60 MMHG | HEART RATE: 84 BPM | RESPIRATION RATE: 16 BRPM | WEIGHT: 212.19 LBS | BODY MASS INDEX: 35.79 KG/M2 | OXYGEN SATURATION: 98 %

## 2021-03-10 DIAGNOSIS — R35.1 BPH ASSOCIATED WITH NOCTURIA: ICD-10-CM

## 2021-03-10 DIAGNOSIS — Z00.00 ENCOUNTER FOR ANNUAL HEALTH EXAMINATION: Primary | ICD-10-CM

## 2021-03-10 DIAGNOSIS — E78.5 HYPERLIPIDEMIA LDL GOAL <100: ICD-10-CM

## 2021-03-10 DIAGNOSIS — N40.1 BPH ASSOCIATED WITH NOCTURIA: ICD-10-CM

## 2021-03-10 DIAGNOSIS — Z12.5 SCREENING FOR MALIGNANT NEOPLASM OF PROSTATE: ICD-10-CM

## 2021-03-10 DIAGNOSIS — E11.9 TYPE 2 DIABETES MELLITUS WITHOUT COMPLICATION, WITHOUT LONG-TERM CURRENT USE OF INSULIN (HCC): ICD-10-CM

## 2021-03-10 DIAGNOSIS — Z12.11 COLON CANCER SCREENING: ICD-10-CM

## 2021-03-10 DIAGNOSIS — I10 ESSENTIAL HYPERTENSION, BENIGN: ICD-10-CM

## 2021-03-10 DIAGNOSIS — Z13.31 DEPRESSION SCREENING: ICD-10-CM

## 2021-03-10 DIAGNOSIS — E66.01 SEVERE OBESITY (BMI 35.0-39.9) WITH COMORBIDITY (HCC): ICD-10-CM

## 2021-03-10 PROCEDURE — G0438 PPPS, INITIAL VISIT: HCPCS | Performed by: FAMILY MEDICINE

## 2021-03-10 PROCEDURE — 97110 THERAPEUTIC EXERCISES: CPT

## 2021-03-10 PROCEDURE — 97140 MANUAL THERAPY 1/> REGIONS: CPT

## 2021-03-10 NOTE — PATIENT INSTRUCTIONS
Allen Amezcua's SCREENING SCHEDULE   Tests on this list are recommended by your physician but may not be covered, or covered at this frequency, by your insurer. Please check with your insurance carrier before scheduling to verify coverage.     PREVENTATIVE Screen   Covered every 10 years- more often if abnormal Colonoscopy due on 03/17/2021 Update [facility] if applicable    Flex Sigmoidoscopy Screen  Covered every 5 years No results found for this or any previous visit. No flowsheet data found. VIII or IX concentrates   Clients of institutions for the mentally retarded   Persons who live in the same house as a HepB virus carrier   Homosexual men   Illicit injectable drug abusers     Tetanus Toxoid- Only covered with a cut with metal- TD and TDaP

## 2021-03-10 NOTE — PROGRESS NOTES
Diagnosis: S/P right rotator cuff repair,IS and SS,  labral debridement, distal clavicle resection (M76.331)    12/1/2020      Precautions:    No PRE's 3-4 months post op.    Medicare     Physical Therapy Progress Report   16 sessions completed    Subjectiv strength to 4/5 to promote improved shoulder mechanics and stabilization with ADL such as lifting and reaching In progress  · Pt will be independent and compliant with comprehensive HEP to maintain progress achieved in PT In progress    Plan:  Pt to f/u wi

## 2021-03-10 NOTE — PROGRESS NOTES
HPI:   Jeny Werner is a 77year old male who presents for a Medicare Subsequent Annual Wellness visit (Pt already had Initial Annual Wellness). Preventative Screening  Colonoscopy - 3/17/2016. Repeat 5 yrs - due upcoming. Prostate - last 2017 - 1. 4. past but quit greater than 12 months ago.   Social History    Tobacco Use      Smoking status: Former Smoker        Packs/day: 1.00        Years: 20.00        Pack years: 20        Types: Cigarettes        Quit date: 1990        Years since quittin MG Oral Tab, TAKE 1 TABLET TWICE DAILY  WITH MEALS  losartan 100 MG Oral Tab, Take 1 tablet (100 mg total) by mouth once daily.   Fenofibrate 145 MG Oral Tab, TAKE 1 TABLET ONCE DAILY  omeprazole 20 MG Oral Capsule Delayed Release, TAKE 1 CAPSULE BY MOUTH Visual Acuity  Right Eye Visual Acuity: Uncorrected Right Eye Chart Acuity: 20/30   Left Eye Visual Acuity: Uncorrected Left Eye Chart Acuity: 20/20   Both Eyes Visual Acuity: Uncorrected Both Eyes Chart Acuity: 20/20   Able To Tolerate Visual Acuity: Yes complete. Colon screening due. 2. Type 2 diabetes mellitus without complication, without long-term current use of insulin (HCC)  Unknown control. Sounds up of late. Continues to watch diet  On three meds - all near max dose.    Will refill glimepirid patient  PREVENTATIVE SERVICES  INDICATIONS AND SCHEDULE Internal Lab or Procedure External Lab or Procedure   Diabetes Screening      HbgA1C   Annually HEMOGLOBIN A1C (%)   Date Value   07/15/2020 7.6 (A)       No flowsheet data found.     Fasting Blood Borges Part B) No vaccine history found This may be covered with your prescription benefits, but Medicare does not cover unless Medically needed    Zoster   Not covered by Medicare Part B No vaccine history found This may be covered with your pharmacy  prescripti

## 2021-03-11 ENCOUNTER — OFFICE VISIT (OUTPATIENT)
Dept: ORTHOPEDICS CLINIC | Facility: CLINIC | Age: 66
End: 2021-03-11
Payer: MEDICARE

## 2021-03-11 DIAGNOSIS — Z09 FOLLOW-UP EXAMINATION, FOLLOWING OTHER SURGERY: ICD-10-CM

## 2021-03-11 DIAGNOSIS — Z98.890 S/P ROTATOR CUFF REPAIR: Primary | ICD-10-CM

## 2021-03-11 PROCEDURE — 99212 OFFICE O/P EST SF 10 MIN: CPT | Performed by: ORTHOPAEDIC SURGERY

## 2021-03-11 NOTE — PROGRESS NOTES
[facility] Post-op Progress Note      Date of Surgery: 12/1/2020      History: Mr. Varinder Hermosillo is a 49-year-old male returning for postop follow-up approximately 3-1/2 months status post rotator cuff repair.   At this point he has no significant resid

## 2021-03-23 NOTE — PROGRESS NOTES
Left message on answering machine to call triage to discuss test results. New kit in triage.  Ask pt if he wants us to mail it or does he want to pick it up

## 2021-03-29 DIAGNOSIS — E11.9 TYPE 2 DIABETES MELLITUS WITHOUT COMPLICATION, WITHOUT LONG-TERM CURRENT USE OF INSULIN (HCC): ICD-10-CM

## 2021-03-29 RX ORDER — GLIMEPIRIDE 2 MG/1
2 TABLET ORAL 2 TIMES DAILY
Qty: 180 TABLET | Refills: 1 | Status: SHIPPED | OUTPATIENT
Start: 2021-03-29 | End: 2021-08-10

## 2021-03-29 NOTE — TELEPHONE ENCOUNTER
LOV 3/10/21 and note states Glimepiride 2mg was increased to BID but new script was not sent.   Next appt 7/13/21  Please verify and send script

## 2021-03-29 NOTE — TELEPHONE ENCOUNTER
Pt is calling his glimepiride script needs to be changed form 1 tablet to 2 tablets per day and sent to [facility]. Dr. Evie Wynn is aware of the change.

## 2021-03-31 ENCOUNTER — OFFICE VISIT (OUTPATIENT)
Dept: PHYSICAL THERAPY | Age: 66
End: 2021-03-31
Attending: ORTHOPAEDIC SURGERY
Payer: MEDICARE

## 2021-03-31 PROCEDURE — 97110 THERAPEUTIC EXERCISES: CPT

## 2021-03-31 PROCEDURE — 97140 MANUAL THERAPY 1/> REGIONS: CPT

## 2021-03-31 NOTE — PROGRESS NOTES
Diagnosis: S/P right rotator cuff repair,IS and SS,  labral debridement, distal clavicle resection (M51.358)    12/1/2020      Precautions:    No PRE's 3-4 months post op. Medicare      16 sessions completed    Subjective: Pt has had f/u with surgeon.  Bernice Garcias pocket, tuck in shirt, and turn steering wheel without pain  Met  · Pt will improve shoulder strength throughout to 4/5 to improve function with ADLs including all dressing and bathing w/o restrictions difficulty. In progress  · Pt will demonstrate increase

## 2021-04-07 ENCOUNTER — OFFICE VISIT (OUTPATIENT)
Dept: PHYSICAL THERAPY | Age: 66
End: 2021-04-07
Attending: ORTHOPAEDIC SURGERY
Payer: MEDICARE

## 2021-04-07 PROCEDURE — 97140 MANUAL THERAPY 1/> REGIONS: CPT

## 2021-04-07 PROCEDURE — 97110 THERAPEUTIC EXERCISES: CPT

## 2021-04-07 NOTE — PROGRESS NOTES
Diagnosis: S/P right rotator cuff repair,IS and SS,  labral debridement, distal clavicle resection (L38.817)    12/1/2020      Precautions:    No PRE's 3-4 months post op.    Medicare   Physical Therapy Discharge Report   17 sessions completed    Subjective will improve shoulder flexion AROM to >160 degrees to be able to reach into overhead cabinets without pain or restriction  Met  · Pt will improve shoulder abduction AROM to >150 degrees to improve ability to don deodorant, don/doff shirts, and wash hair  M

## 2021-04-15 ENCOUNTER — TELEPHONE (OUTPATIENT)
Dept: FAMILY MEDICINE CLINIC | Facility: CLINIC | Age: 66
End: 2021-04-15

## 2021-04-15 NOTE — TELEPHONE ENCOUNTER
Called and told patient that his lab work was already in and he just needed to schedule it and fast 10-12 hours

## 2021-07-12 DIAGNOSIS — E11.9 TYPE 2 DIABETES MELLITUS WITHOUT COMPLICATION, WITHOUT LONG-TERM CURRENT USE OF INSULIN (HCC): ICD-10-CM

## 2021-07-12 DIAGNOSIS — I10 ESSENTIAL HYPERTENSION, BENIGN: ICD-10-CM

## 2021-07-12 RX ORDER — GLIMEPIRIDE 2 MG/1
TABLET ORAL
Qty: 180 TABLET | Refills: 1 | Status: CANCELLED | OUTPATIENT
Start: 2021-07-12

## 2021-07-14 RX ORDER — HYDROCHLOROTHIAZIDE 25 MG/1
TABLET ORAL
Qty: 90 TABLET | Refills: 2 | Status: SHIPPED | OUTPATIENT
Start: 2021-07-14 | End: 2022-01-18

## 2021-07-14 RX ORDER — GLIMEPIRIDE 2 MG/1
TABLET ORAL
Qty: 90 TABLET | Refills: 0 | OUTPATIENT
Start: 2021-07-14

## 2021-07-14 NOTE — TELEPHONE ENCOUNTER
Patient is calling that the   hydrochlorothiazide 25 MG Oral Tab is out patient uses [facility] . They stated they have been trying to reach us and we haven't responded.   [email or web address]

## 2021-07-14 NOTE — TELEPHONE ENCOUNTER
Glimepiride refilled on 3/29/21 for 90 days plus one additional Pt should have enough med until 9/29/21    LOV 03/10/2021 Chastity physical  NOTED  Essential hypertension, benign  BP controlled  Stable meds.     Follow up scheduled 08/10/21 Chastity mcclendon

## 2021-08-10 ENCOUNTER — OFFICE VISIT (OUTPATIENT)
Dept: FAMILY MEDICINE CLINIC | Facility: CLINIC | Age: 66
End: 2021-08-10
Payer: MEDICARE

## 2021-08-10 VITALS
OXYGEN SATURATION: 98 % | RESPIRATION RATE: 16 BRPM | TEMPERATURE: 99 F | DIASTOLIC BLOOD PRESSURE: 62 MMHG | HEART RATE: 82 BPM | HEIGHT: 64 IN | BODY MASS INDEX: 36.05 KG/M2 | SYSTOLIC BLOOD PRESSURE: 120 MMHG | WEIGHT: 211.13 LBS

## 2021-08-10 DIAGNOSIS — E11.9 TYPE 2 DIABETES MELLITUS WITHOUT COMPLICATION, WITHOUT LONG-TERM CURRENT USE OF INSULIN (HCC): Primary | ICD-10-CM

## 2021-08-10 DIAGNOSIS — E78.5 HYPERLIPIDEMIA LDL GOAL <100: ICD-10-CM

## 2021-08-10 DIAGNOSIS — I10 ESSENTIAL HYPERTENSION, BENIGN: ICD-10-CM

## 2021-08-10 LAB
CARTRIDGE LOT#: 814 NUMERIC
HEMOGLOBIN A1C: 7.9 % (ref 4.3–5.6)

## 2021-08-10 PROCEDURE — 83036 HEMOGLOBIN GLYCOSYLATED A1C: CPT | Performed by: FAMILY MEDICINE

## 2021-08-10 PROCEDURE — 99214 OFFICE O/P EST MOD 30 MIN: CPT | Performed by: FAMILY MEDICINE

## 2021-08-10 RX ORDER — GLIMEPIRIDE 2 MG/1
2 TABLET ORAL 2 TIMES DAILY
Qty: 180 TABLET | Refills: 1 | Status: SHIPPED | OUTPATIENT
Start: 2021-08-10

## 2021-08-10 NOTE — PROGRESS NOTES
Patient presents with: Follow - Up: 4 Month Diabetic Check In     HPI:   Tru Gates is a 77year old male who presents for a diabetic visit. 3 mo ago A1C 8.1. Typical blood sugar levels are varied greatly. In general better numbers - 160s.     Current Value Date/Time    MINGATSGUANAKITO 1.16 05/12/2021 07:38 AM        Urine Studies:   Micro Albumen/Creatinine:    Lab Results   Component Value Date    MALBCREACALC 6 05/12/2021    [facility]    MALBCREACALC 12 03/07/2018       Social History normal as well. ASSESSMENT AND PLAN:     Junior Velasco was seen in the office today:  had concerns including Follow - Up (4 Month Diabetic Check In).     1. Type 2 diabetes mellitus without complication, without long-term current use of insulin ([facility] [address]  [place]

## 2021-09-17 ENCOUNTER — TELEPHONE (OUTPATIENT)
Dept: FAMILY MEDICINE CLINIC | Facility: CLINIC | Age: 66
End: 2021-09-17

## 2021-09-17 DIAGNOSIS — E11.9 TYPE 2 DIABETES MELLITUS WITHOUT COMPLICATION, WITHOUT LONG-TERM CURRENT USE OF INSULIN (HCC): ICD-10-CM

## 2021-09-17 NOTE — TELEPHONE ENCOUNTER
Pt states [facility] Rafael[facility] did not receive refill for Glimepiride on 8/10, as stated on confirmation receipt.  Pt is asking to have medication called in again as he is going to leaving for vacation for an extended period of time and would like to have meds befo

## 2021-12-09 DIAGNOSIS — E78.5 HYPERLIPIDEMIA LDL GOAL <100: ICD-10-CM

## 2021-12-09 DIAGNOSIS — E11.9 TYPE 2 DIABETES MELLITUS WITHOUT COMPLICATION, WITHOUT LONG-TERM CURRENT USE OF INSULIN (HCC): ICD-10-CM

## 2021-12-09 DIAGNOSIS — I10 ESSENTIAL HYPERTENSION, BENIGN: ICD-10-CM

## 2021-12-09 DIAGNOSIS — K21.9 GASTROESOPHAGEAL REFLUX DISEASE: ICD-10-CM

## 2021-12-09 RX ORDER — GLIMEPIRIDE 2 MG/1
TABLET ORAL
Qty: 90 TABLET | Refills: 0 | Status: CANCELLED | OUTPATIENT
Start: 2021-12-09

## 2021-12-14 ENCOUNTER — PATIENT MESSAGE (OUTPATIENT)
Dept: FAMILY MEDICINE CLINIC | Facility: CLINIC | Age: 66
End: 2021-12-14

## 2021-12-14 RX ORDER — LOSARTAN POTASSIUM 100 MG/1
TABLET ORAL
Qty: 90 TABLET | Refills: 3 | OUTPATIENT
Start: 2021-12-14

## 2021-12-14 RX ORDER — OMEPRAZOLE 20 MG/1
CAPSULE, DELAYED RELEASE ORAL
Qty: 90 CAPSULE | Refills: 3 | OUTPATIENT
Start: 2021-12-14

## 2021-12-14 RX ORDER — GLIMEPIRIDE 2 MG/1
TABLET ORAL
Qty: 180 TABLET | Refills: 1 | OUTPATIENT
Start: 2021-12-14

## 2021-12-14 RX ORDER — SITAGLIPTIN 100 MG/1
TABLET, FILM COATED ORAL
Qty: 90 TABLET | Refills: 3 | OUTPATIENT
Start: 2021-12-14

## 2021-12-14 RX ORDER — FENOFIBRATE 145 MG/1
TABLET, COATED ORAL
Qty: 90 TABLET | Refills: 3 | OUTPATIENT
Start: 2021-12-14

## 2021-12-14 NOTE — TELEPHONE ENCOUNTER
Notified pt that he has refills remaining. Therefore, prescription refill were denied. Pt verbalized understanding.

## 2021-12-14 NOTE — TELEPHONE ENCOUNTER
LOSARTAN TAB 100MG          Will file in chart as: LOSARTAN 100 MG Oral Tab    Sig: TAKE 1 TABLET ONCE DAILY    Disp:  90 tablet    Refills:  3    Start: 12/9/2021    Class: Normal    Non-formulary For: Essential hypertension, benign    Last ordered: 10 mo Cholesterol Medication Protocol Passed 12/09/2021 08:50 AM   Protocol Details  ALT < 80    ALT resulted within past year    Lipid panel within past 12 months    Appointment within past 12 or next 3 months       Name from pharmacy: Mikala Higgins TAB 100MG

## 2021-12-14 NOTE — TELEPHONE ENCOUNTER
From: Madan Dunlap  To: Evelyn Abdi MD  Sent: 12/14/2021 4:13 PM CST  Subject: Refill denial    Please tell me why my refill request was denied.

## 2022-01-18 DIAGNOSIS — I10 ESSENTIAL HYPERTENSION, BENIGN: ICD-10-CM

## 2022-01-18 RX ORDER — HYDROCHLOROTHIAZIDE 25 MG/1
TABLET ORAL
Qty: 90 TABLET | Refills: 0 | Status: SHIPPED | OUTPATIENT
Start: 2022-01-18

## 2022-01-18 NOTE — TELEPHONE ENCOUNTER
Requested Prescriptions     Pending Prescriptions Disp Refills   • HYDROCHLOROTHIAZIDE 25 MG Oral Tab [Pharmacy Med Name: HYDROCHLOROT TAB 25MG] 90 tablet 2     Sig: TAKE 1 TABLET DAILY     LOV 8/10/2021     Patient was asked to follow-up in: 6 months    A

## 2022-02-22 RX ORDER — GLIMEPIRIDE 2 MG/1
TABLET ORAL
Qty: 90 TABLET | Refills: 0 | Status: CANCELLED | OUTPATIENT
Start: 2022-02-22

## 2022-02-23 RX ORDER — FENOFIBRATE 145 MG/1
TABLET, COATED ORAL
Qty: 90 TABLET | Refills: 3 | Status: SHIPPED | OUTPATIENT
Start: 2022-02-23

## 2022-02-23 RX ORDER — LOSARTAN POTASSIUM 100 MG/1
TABLET ORAL
Qty: 90 TABLET | Refills: 3 | Status: SHIPPED | OUTPATIENT
Start: 2022-02-23

## 2022-02-23 RX ORDER — OMEPRAZOLE 20 MG/1
CAPSULE, DELAYED RELEASE ORAL
Qty: 90 CAPSULE | Refills: 3 | Status: SHIPPED | OUTPATIENT
Start: 2022-02-23

## 2022-02-23 RX ORDER — GLIMEPIRIDE 2 MG/1
TABLET ORAL
Qty: 180 TABLET | Refills: 3 | Status: SHIPPED | OUTPATIENT
Start: 2022-02-23

## 2022-02-23 RX ORDER — SITAGLIPTIN 100 MG/1
TABLET, FILM COATED ORAL
Qty: 90 TABLET | Refills: 3 | Status: SHIPPED | OUTPATIENT
Start: 2022-02-23

## 2022-02-28 ENCOUNTER — TELEPHONE (OUTPATIENT)
Dept: FAMILY MEDICINE CLINIC | Facility: CLINIC | Age: 67
End: 2022-02-28

## 2022-02-28 NOTE — TELEPHONE ENCOUNTER
Last refilled       HYDROCHLOROTHIAZIDE 25 MG Oral Tab 90 tablet 0 1/18/2022     Sig: TAKE 1 TABLET DAILY      Call made to patient to discuss. He states he tried to refill and did not receive. Call made to pharmacy. Patient last filled 12/15 so his recent request was too soon. She will have the January RX sent. Patient just needs to call to confirm. Patient notified. Patient verbalized understanding of information given.

## 2022-02-28 NOTE — TELEPHONE ENCOUNTER
Pt states that we sent in for all his meds except HYDROCHLOROTHIAZIDE 25 MG Oral Tab.  Please send to his [facility]

## 2022-03-15 ENCOUNTER — OFFICE VISIT (OUTPATIENT)
Dept: FAMILY MEDICINE CLINIC | Facility: CLINIC | Age: 67
End: 2022-03-15
Payer: MEDICARE

## 2022-03-15 ENCOUNTER — TELEPHONE (OUTPATIENT)
Dept: PHYSICAL THERAPY | Facility: HOSPITAL | Age: 67
End: 2022-03-15

## 2022-03-15 VITALS
WEIGHT: 212.19 LBS | HEART RATE: 72 BPM | SYSTOLIC BLOOD PRESSURE: 138 MMHG | HEIGHT: 64 IN | RESPIRATION RATE: 18 BRPM | DIASTOLIC BLOOD PRESSURE: 60 MMHG | BODY MASS INDEX: 36.23 KG/M2

## 2022-03-15 DIAGNOSIS — M54.32 LEFT SIDED SCIATICA: ICD-10-CM

## 2022-03-15 DIAGNOSIS — Z00.00 ENCOUNTER FOR ANNUAL HEALTH EXAMINATION: Primary | ICD-10-CM

## 2022-03-15 DIAGNOSIS — I10 ESSENTIAL HYPERTENSION, BENIGN: ICD-10-CM

## 2022-03-15 DIAGNOSIS — E78.5 HYPERLIPIDEMIA LDL GOAL <100: ICD-10-CM

## 2022-03-15 DIAGNOSIS — Z12.11 COLON CANCER SCREENING: ICD-10-CM

## 2022-03-15 DIAGNOSIS — E66.01 SEVERE OBESITY (BMI 35.0-39.9) WITH COMORBIDITY (HCC): ICD-10-CM

## 2022-03-15 DIAGNOSIS — Z13.31 DEPRESSION SCREENING: ICD-10-CM

## 2022-03-15 DIAGNOSIS — E11.9 TYPE 2 DIABETES MELLITUS WITHOUT COMPLICATION, WITHOUT LONG-TERM CURRENT USE OF INSULIN (HCC): ICD-10-CM

## 2022-03-15 DIAGNOSIS — Z11.59 NEED FOR HEPATITIS C SCREENING TEST: ICD-10-CM

## 2022-03-15 DIAGNOSIS — M24.542 CONTRACTURE OF JOINT OF HAND, LEFT: ICD-10-CM

## 2022-03-15 DIAGNOSIS — Z12.5 SCREENING FOR MALIGNANT NEOPLASM OF PROSTATE: ICD-10-CM

## 2022-03-15 PROCEDURE — G0439 PPPS, SUBSEQ VISIT: HCPCS | Performed by: FAMILY MEDICINE

## 2022-03-15 PROCEDURE — 99214 OFFICE O/P EST MOD 30 MIN: CPT | Performed by: FAMILY MEDICINE

## 2022-03-15 PROCEDURE — G0444 DEPRESSION SCREEN ANNUAL: HCPCS | Performed by: FAMILY MEDICINE

## 2022-03-28 ENCOUNTER — OFFICE VISIT (OUTPATIENT)
Dept: PHYSICAL THERAPY | Age: 67
End: 2022-03-28
Attending: FAMILY MEDICINE
Payer: MEDICARE

## 2022-03-28 DIAGNOSIS — M54.32 LEFT SIDED SCIATICA: ICD-10-CM

## 2022-03-28 PROCEDURE — 97161 PT EVAL LOW COMPLEX 20 MIN: CPT

## 2022-03-28 PROCEDURE — 97110 THERAPEUTIC EXERCISES: CPT

## 2022-03-30 ENCOUNTER — OFFICE VISIT (OUTPATIENT)
Dept: PHYSICAL THERAPY | Age: 67
End: 2022-03-30
Attending: FAMILY MEDICINE
Payer: MEDICARE

## 2022-03-30 PROCEDURE — 97110 THERAPEUTIC EXERCISES: CPT

## 2022-04-11 ENCOUNTER — OFFICE VISIT (OUTPATIENT)
Dept: PHYSICAL THERAPY | Age: 67
End: 2022-04-11
Attending: FAMILY MEDICINE
Payer: MEDICARE

## 2022-04-11 PROCEDURE — 97110 THERAPEUTIC EXERCISES: CPT

## 2022-04-13 ENCOUNTER — OFFICE VISIT (OUTPATIENT)
Dept: PHYSICAL THERAPY | Age: 67
End: 2022-04-13
Attending: FAMILY MEDICINE
Payer: MEDICARE

## 2022-04-13 PROCEDURE — 97110 THERAPEUTIC EXERCISES: CPT

## 2022-04-19 ENCOUNTER — OFFICE VISIT (OUTPATIENT)
Dept: PHYSICAL THERAPY | Age: 67
End: 2022-04-19
Attending: FAMILY MEDICINE
Payer: MEDICARE

## 2022-04-19 PROCEDURE — 97110 THERAPEUTIC EXERCISES: CPT

## 2022-04-21 ENCOUNTER — OFFICE VISIT (OUTPATIENT)
Dept: PHYSICAL THERAPY | Age: 67
End: 2022-04-21
Attending: FAMILY MEDICINE
Payer: MEDICARE

## 2022-04-21 PROCEDURE — 97110 THERAPEUTIC EXERCISES: CPT

## 2022-04-25 ENCOUNTER — APPOINTMENT (OUTPATIENT)
Dept: PHYSICAL THERAPY | Age: 67
End: 2022-04-25
Attending: FAMILY MEDICINE
Payer: MEDICARE

## 2022-04-26 ENCOUNTER — OFFICE VISIT (OUTPATIENT)
Dept: PHYSICAL THERAPY | Age: 67
End: 2022-04-26
Attending: FAMILY MEDICINE
Payer: MEDICARE

## 2022-04-26 PROCEDURE — 97110 THERAPEUTIC EXERCISES: CPT

## 2022-04-27 ENCOUNTER — APPOINTMENT (OUTPATIENT)
Dept: PHYSICAL THERAPY | Age: 67
End: 2022-04-27
Attending: FAMILY MEDICINE
Payer: MEDICARE

## 2022-04-28 ENCOUNTER — OFFICE VISIT (OUTPATIENT)
Dept: PHYSICAL THERAPY | Age: 67
End: 2022-04-28
Attending: FAMILY MEDICINE
Payer: MEDICARE

## 2022-04-28 PROCEDURE — 97110 THERAPEUTIC EXERCISES: CPT

## 2022-05-18 ENCOUNTER — TELEPHONE (OUTPATIENT)
Dept: FAMILY MEDICINE CLINIC | Facility: CLINIC | Age: 67
End: 2022-05-18

## 2022-05-18 ENCOUNTER — OFFICE VISIT (OUTPATIENT)
Dept: PHYSICAL THERAPY | Age: 67
End: 2022-05-18
Attending: FAMILY MEDICINE
Payer: MEDICARE

## 2022-05-18 DIAGNOSIS — I10 ESSENTIAL HYPERTENSION, BENIGN: ICD-10-CM

## 2022-05-18 PROCEDURE — 97110 THERAPEUTIC EXERCISES: CPT

## 2022-05-18 RX ORDER — HYDROCHLOROTHIAZIDE 25 MG/1
25 TABLET ORAL DAILY
Qty: 90 TABLET | Refills: 0 | Status: SHIPPED | OUTPATIENT
Start: 2022-05-18

## 2022-05-19 ENCOUNTER — PATIENT MESSAGE (OUTPATIENT)
Dept: FAMILY MEDICINE CLINIC | Facility: CLINIC | Age: 67
End: 2022-05-19

## 2022-05-19 ENCOUNTER — PATIENT OUTREACH (OUTPATIENT)
Dept: FAMILY MEDICINE CLINIC | Facility: CLINIC | Age: 67
End: 2022-05-19

## 2022-05-19 NOTE — TELEPHONE ENCOUNTER
From: Troy Cash  Sent: 5/19/2022 3:19 PM CDT  To: [facility] Clinical Staff  Subject: Due: Diabetic eye exam    Already scheduled. There's no reason to emilee of me of this. My eye doctor already does.

## 2022-05-23 ENCOUNTER — OFFICE VISIT (OUTPATIENT)
Dept: PHYSICAL THERAPY | Age: 67
End: 2022-05-23
Attending: FAMILY MEDICINE
Payer: MEDICARE

## 2022-05-23 PROCEDURE — 97110 THERAPEUTIC EXERCISES: CPT

## 2022-08-03 DIAGNOSIS — I10 ESSENTIAL HYPERTENSION, BENIGN: ICD-10-CM

## 2022-08-08 RX ORDER — HYDROCHLOROTHIAZIDE 25 MG/1
TABLET ORAL
Qty: 90 TABLET | Refills: 0 | Status: SHIPPED | OUTPATIENT
Start: 2022-08-08

## 2022-08-30 ENCOUNTER — TELEPHONE (OUTPATIENT)
Dept: FAMILY MEDICINE CLINIC | Facility: CLINIC | Age: 67
End: 2022-08-30

## 2022-08-30 DIAGNOSIS — Z12.5 SPECIAL SCREENING FOR MALIGNANT NEOPLASM OF PROSTATE: ICD-10-CM

## 2022-08-30 DIAGNOSIS — E78.5 HYPERLIPIDEMIA LDL GOAL <100: ICD-10-CM

## 2022-08-30 DIAGNOSIS — E11.9 TYPE 2 DIABETES MELLITUS WITHOUT COMPLICATION, WITHOUT LONG-TERM CURRENT USE OF INSULIN (HCC): Primary | ICD-10-CM

## 2022-08-30 DIAGNOSIS — Z11.59 NEED FOR HEPATITIS C SCREENING TEST: ICD-10-CM

## 2022-08-30 NOTE — TELEPHONE ENCOUNTER
Please send his labs to [facility] and he would a call to notify him when they have been moved to [facility]

## 2022-09-03 LAB
% FREE PSA: 27 % (CALC)
ALBUMIN/GLOBULIN RATIO: 1.5 (CALC) (ref 1–2.5)
ALBUMIN: 4.3 G/DL (ref 3.6–5.1)
ALKALINE PHOSPHATASE: 50 U/L (ref 35–144)
ALT: 42 U/L (ref 9–46)
AST: 41 U/L (ref 10–35)
BILIRUBIN, TOTAL: 0.7 MG/DL (ref 0.2–1.2)
BUN: 17 MG/DL (ref 7–25)
CALCIUM: 10.1 MG/DL (ref 8.6–10.3)
CARBON DIOXIDE: 27 MMOL/L (ref 20–32)
CHLORIDE: 100 MMOL/L (ref 98–110)
CHOL/HDLC RATIO: 7.6 (CALC)
CHOLESTEROL, TOTAL: 213 MG/DL
CREATININE: 1.12 MG/DL (ref 0.7–1.35)
DIRECT LDL: 100 MG/DL
EGFR: 72 ML/MIN/1.73M2
FREE PSA: 0.8 NG/ML
GLOBULIN: 2.8 G/DL (CALC) (ref 1.9–3.7)
GLUCOSE: 163 MG/DL (ref 65–99)
HDL CHOLESTEROL: 28 MG/DL
HEMOGLOBIN A1C: 8 % OF TOTAL HGB
MICROALBUMIN: 1.5 MG/DL
NON-HDL CHOLESTEROL: 185 MG/DL (CALC)
POTASSIUM: 4.1 MMOL/L (ref 3.5–5.3)
PROTEIN, TOTAL: 7.1 G/DL (ref 6.1–8.1)
SODIUM: 137 MMOL/L (ref 135–146)
TOTAL PSA: 3 NG/ML
TRIGLYCERIDES: 572 MG/DL

## 2022-09-21 ENCOUNTER — OFFICE VISIT (OUTPATIENT)
Dept: FAMILY MEDICINE CLINIC | Facility: CLINIC | Age: 67
End: 2022-09-21

## 2022-09-21 VITALS
HEART RATE: 88 BPM | BODY MASS INDEX: 36.02 KG/M2 | WEIGHT: 211 LBS | TEMPERATURE: 98 F | RESPIRATION RATE: 16 BRPM | HEIGHT: 64 IN | DIASTOLIC BLOOD PRESSURE: 66 MMHG | OXYGEN SATURATION: 98 % | SYSTOLIC BLOOD PRESSURE: 138 MMHG

## 2022-09-21 DIAGNOSIS — E78.5 HYPERLIPIDEMIA LDL GOAL <100: Primary | ICD-10-CM

## 2022-09-21 DIAGNOSIS — E11.9 TYPE 2 DIABETES MELLITUS WITHOUT COMPLICATION, WITHOUT LONG-TERM CURRENT USE OF INSULIN (HCC): ICD-10-CM

## 2022-09-21 DIAGNOSIS — I10 ESSENTIAL HYPERTENSION, BENIGN: ICD-10-CM

## 2022-09-21 PROBLEM — E66.01 MORBID (SEVERE) OBESITY DUE TO EXCESS CALORIES (HCC): Status: ACTIVE | Noted: 2022-09-21

## 2022-09-21 PROCEDURE — 99214 OFFICE O/P EST MOD 30 MIN: CPT | Performed by: FAMILY MEDICINE

## 2022-09-21 RX ORDER — GLIMEPIRIDE 4 MG/1
4 TABLET ORAL 2 TIMES DAILY
Qty: 180 TABLET | Refills: 3 | Status: SHIPPED | OUTPATIENT
Start: 2022-09-21

## 2022-09-21 RX ORDER — HYDROCHLOROTHIAZIDE 25 MG/1
25 TABLET ORAL DAILY
Qty: 90 TABLET | Refills: 3 | Status: SHIPPED | OUTPATIENT
Start: 2022-09-21

## 2022-09-21 RX ORDER — ROSUVASTATIN CALCIUM 10 MG/1
10 TABLET, COATED ORAL NIGHTLY
Qty: 90 TABLET | Refills: 3 | Status: SHIPPED | OUTPATIENT
Start: 2022-09-21

## 2022-12-15 ENCOUNTER — IMMUNIZATION (OUTPATIENT)
Dept: FAMILY MEDICINE CLINIC | Facility: CLINIC | Age: 67
End: 2022-12-15
Payer: MEDICARE

## 2022-12-15 DIAGNOSIS — Z23 NEED FOR VACCINATION: Primary | ICD-10-CM

## 2022-12-15 PROCEDURE — 90662 IIV NO PRSV INCREASED AG IM: CPT | Performed by: FAMILY MEDICINE

## 2022-12-15 PROCEDURE — G0008 ADMIN INFLUENZA VIRUS VAC: HCPCS | Performed by: FAMILY MEDICINE

## 2022-12-29 DIAGNOSIS — K21.9 GASTROESOPHAGEAL REFLUX DISEASE: ICD-10-CM

## 2022-12-30 RX ORDER — OMEPRAZOLE 20 MG/1
CAPSULE, DELAYED RELEASE ORAL
Qty: 90 CAPSULE | Refills: 3 | OUTPATIENT
Start: 2022-12-30

## 2022-12-30 NOTE — TELEPHONE ENCOUNTER
rx request too soon. OMEPRAZOL RX CAP 20MG    Was filled on 2/23/22 for 90 caps 3 refills, 1 cap daily. Pt should have another refill on file.

## 2023-02-07 DIAGNOSIS — K21.9 GASTROESOPHAGEAL REFLUX DISEASE: ICD-10-CM

## 2023-02-07 DIAGNOSIS — E78.5 HYPERLIPIDEMIA LDL GOAL <100: ICD-10-CM

## 2023-02-07 DIAGNOSIS — I10 ESSENTIAL HYPERTENSION, BENIGN: ICD-10-CM

## 2023-02-07 DIAGNOSIS — E11.9 TYPE 2 DIABETES MELLITUS WITHOUT COMPLICATION, WITHOUT LONG-TERM CURRENT USE OF INSULIN (HCC): ICD-10-CM

## 2023-02-08 RX ORDER — LOSARTAN POTASSIUM 100 MG/1
TABLET ORAL
Qty: 90 TABLET | Refills: 3 | Status: SHIPPED | OUTPATIENT
Start: 2023-02-08

## 2023-02-08 RX ORDER — OMEPRAZOLE 20 MG/1
CAPSULE, DELAYED RELEASE ORAL
Qty: 90 CAPSULE | Refills: 3 | Status: SHIPPED | OUTPATIENT
Start: 2023-02-08

## 2023-02-08 RX ORDER — FENOFIBRATE 145 MG/1
TABLET, COATED ORAL
Qty: 90 TABLET | Refills: 3 | Status: SHIPPED | OUTPATIENT
Start: 2023-02-08

## 2023-02-08 RX ORDER — SITAGLIPTIN 100 MG/1
TABLET, FILM COATED ORAL
Qty: 90 TABLET | Refills: 3 | Status: SHIPPED | OUTPATIENT
Start: 2023-02-08

## 2023-02-25 LAB
ALBUMIN/GLOBULIN RATIO: 1.8 (CALC) (ref 1–2.5)
ALBUMIN: 4.4 G/DL (ref 3.6–5.1)
ALKALINE PHOSPHATASE: 45 U/L (ref 35–144)
ALT: 37 U/L (ref 9–46)
AST: 45 U/L (ref 10–35)
BILIRUBIN, TOTAL: 0.6 MG/DL (ref 0.2–1.2)
BUN: 25 MG/DL (ref 7–25)
CALCIUM: 10 MG/DL (ref 8.6–10.3)
CARBON DIOXIDE: 26 MMOL/L (ref 20–32)
CHLORIDE: 101 MMOL/L (ref 98–110)
CHOL/HDLC RATIO: 6.2 (CALC)
CHOLESTEROL, TOTAL: 160 MG/DL
CREATININE, RANDOM URINE: 182 MG/DL (ref 20–320)
CREATININE: 1.35 MG/DL (ref 0.7–1.35)
EGFR: 57 ML/MIN/1.73M2
GLOBULIN: 2.5 G/DL (CALC) (ref 1.9–3.7)
GLUCOSE: 151 MG/DL (ref 65–99)
HDL CHOLESTEROL: 26 MG/DL
HEMOGLOBIN A1C: 7.8 % OF TOTAL HGB
MICROALBUMIN/CREATININE RATIO, RANDOM URINE: 8 MCG/MG CREAT
MICROALBUMIN: 1.4 MG/DL
NON-HDL CHOLESTEROL: 134 MG/DL (CALC)
POTASSIUM: 4.1 MMOL/L (ref 3.5–5.3)
PROTEIN, TOTAL: 6.9 G/DL (ref 6.1–8.1)
SODIUM: 139 MMOL/L (ref 135–146)
TRIGLYCERIDES: 531 MG/DL

## 2023-03-07 ENCOUNTER — OFFICE VISIT (OUTPATIENT)
Dept: FAMILY MEDICINE CLINIC | Facility: CLINIC | Age: 68
End: 2023-03-07
Payer: MEDICARE

## 2023-03-07 VITALS
WEIGHT: 213 LBS | HEIGHT: 64 IN | HEART RATE: 83 BPM | BODY MASS INDEX: 36.37 KG/M2 | DIASTOLIC BLOOD PRESSURE: 68 MMHG | RESPIRATION RATE: 16 BRPM | SYSTOLIC BLOOD PRESSURE: 136 MMHG

## 2023-03-07 DIAGNOSIS — Z00.00 ENCOUNTER FOR ANNUAL HEALTH EXAMINATION: Primary | ICD-10-CM

## 2023-03-07 DIAGNOSIS — I10 ESSENTIAL HYPERTENSION, BENIGN: ICD-10-CM

## 2023-03-07 DIAGNOSIS — R35.1 BPH ASSOCIATED WITH NOCTURIA: ICD-10-CM

## 2023-03-07 DIAGNOSIS — E11.9 TYPE 2 DIABETES MELLITUS WITHOUT COMPLICATION, WITHOUT LONG-TERM CURRENT USE OF INSULIN (HCC): ICD-10-CM

## 2023-03-07 DIAGNOSIS — N40.1 BPH ASSOCIATED WITH NOCTURIA: ICD-10-CM

## 2023-03-07 DIAGNOSIS — Z12.5 SCREENING FOR PROSTATE CANCER: ICD-10-CM

## 2023-03-07 DIAGNOSIS — Z12.11 COLON CANCER SCREENING: ICD-10-CM

## 2023-03-07 DIAGNOSIS — E66.01 SEVERE OBESITY (BMI 35.0-39.9) WITH COMORBIDITY (HCC): ICD-10-CM

## 2023-03-07 DIAGNOSIS — E78.5 HYPERLIPIDEMIA LDL GOAL <100: ICD-10-CM

## 2023-03-07 PROCEDURE — G0439 PPPS, SUBSEQ VISIT: HCPCS | Performed by: FAMILY MEDICINE

## 2023-03-07 PROCEDURE — 1125F AMNT PAIN NOTED PAIN PRSNT: CPT | Performed by: FAMILY MEDICINE

## 2023-03-14 ENCOUNTER — OFFICE VISIT (OUTPATIENT)
Dept: FAMILY MEDICINE CLINIC | Facility: CLINIC | Age: 68
End: 2023-03-14
Payer: MEDICARE

## 2023-03-14 VITALS
TEMPERATURE: 97 F | SYSTOLIC BLOOD PRESSURE: 160 MMHG | HEART RATE: 63 BPM | DIASTOLIC BLOOD PRESSURE: 64 MMHG | BODY MASS INDEX: 36.37 KG/M2 | WEIGHT: 213 LBS | HEIGHT: 64 IN

## 2023-03-14 DIAGNOSIS — S76.112A QUADRICEPS STRAIN, LEFT, INITIAL ENCOUNTER: ICD-10-CM

## 2023-03-14 DIAGNOSIS — M54.42 ACUTE LEFT-SIDED LOW BACK PAIN WITH LEFT-SIDED SCIATICA: Primary | ICD-10-CM

## 2023-03-14 PROCEDURE — 99213 OFFICE O/P EST LOW 20 MIN: CPT | Performed by: FAMILY MEDICINE

## 2023-03-14 RX ORDER — CYCLOBENZAPRINE HCL 10 MG
10 TABLET ORAL 3 TIMES DAILY PRN
Qty: 30 TABLET | Refills: 0 | Status: SHIPPED | OUTPATIENT
Start: 2023-03-14 | End: 2023-03-24

## 2023-06-06 NOTE — PROGRESS NOTES
[facility] Post-op Progress Note      Date of Surgery: 12/1/2020      History:  The patient is a 77year old male who presents for postop follow-up approximately 1 month after undergoing arthroscopy.   The patient is tolerating physical therapy Pt ate 75% of breakfast tray

## 2023-08-07 DIAGNOSIS — E78.5 HYPERLIPIDEMIA LDL GOAL <100: ICD-10-CM

## 2023-08-07 DIAGNOSIS — E11.9 TYPE 2 DIABETES MELLITUS WITHOUT COMPLICATION, WITHOUT LONG-TERM CURRENT USE OF INSULIN (HCC): ICD-10-CM

## 2023-08-07 RX ORDER — GLIMEPIRIDE 4 MG/1
4 TABLET ORAL 2 TIMES DAILY
Qty: 180 TABLET | Refills: 3 | Status: SHIPPED | OUTPATIENT
Start: 2023-08-07

## 2023-08-07 RX ORDER — ROSUVASTATIN CALCIUM 10 MG/1
10 TABLET, COATED ORAL NIGHTLY
Qty: 90 TABLET | Refills: 3 | Status: SHIPPED | OUTPATIENT
Start: 2023-08-07

## 2023-08-07 NOTE — TELEPHONE ENCOUNTER
Requested Prescriptions     Pending Prescriptions Disp Refills    glimepiride 4 MG Oral Tab 180 tablet 3     Sig: Take 1 tablet (4 mg total) by mouth 2 (two) times daily. rosuvastatin 10 MG Oral Tab 90 tablet 3     Sig: Take 1 tablet (10 mg total) by mouth nightly. LOV 3/14/2023 (acute), 3/7/23 (physical)    Patient was asked to follow-up in: 6 months    Appointment scheduled: 9/26/2023 Kev Thomas MD     Unable to refill per protocol. No recent A1C and last one elevated 2/24/23 at 7.8.

## 2023-08-07 NOTE — TELEPHONE ENCOUNTER
Pt is calling for refills on   glimepiride 4 MG Oral Tab       rosuvastatin 10 MG Oral Tab     [facility] [place] - [address] AT [address]AL TO [address], [phone], [phone] [[id number]]

## 2023-09-06 NOTE — TELEPHONE ENCOUNTER
Detail Level: Detailed Valsartan (Tab) DIOVAN 80 MG     Patient needs medication refill 80mg but needs 160mg he had dicussed with Dr. Burt Pittman please refill to [facility].   [facility] is saying on his print out that Dr. Burt Pittman is in [place] patient wants to make sure that they h Add 1585x Cpt? (Do Not Bill If You Billed For The Procedure Placing The Sutures. This Is An Add-On Code That Must Be Billed With An E/M Visit Code): No

## 2023-09-27 LAB
ALBUMIN/GLOBULIN RATIO: 1.9 (CALC) (ref 1–2.5)
ALBUMIN: 4.7 G/DL (ref 3.6–5.1)
ALKALINE PHOSPHATASE: 57 U/L (ref 35–144)
ALT: 48 U/L (ref 9–46)
AST: 53 U/L (ref 10–35)
BILIRUBIN, TOTAL: 1 MG/DL (ref 0.2–1.2)
BUN: 23 MG/DL (ref 7–25)
CALCIUM: 10 MG/DL (ref 8.6–10.3)
CARBON DIOXIDE: 27 MMOL/L (ref 20–32)
CHLORIDE: 98 MMOL/L (ref 98–110)
CREATININE: 1.17 MG/DL (ref 0.7–1.35)
EGFR: 67 ML/MIN/1.73M2
GLOBULIN: 2.5 G/DL (CALC) (ref 1.9–3.7)
GLUCOSE: 212 MG/DL (ref 65–99)
HEMOGLOBIN A1C: 8.4 % OF TOTAL HGB
POTASSIUM: 4.2 MMOL/L (ref 3.5–5.3)
PROTEIN, TOTAL: 7.2 G/DL (ref 6.1–8.1)
SODIUM: 137 MMOL/L (ref 135–146)
TOTAL PSA: 1.9 NG/ML

## 2023-09-29 ENCOUNTER — OFFICE VISIT (OUTPATIENT)
Dept: FAMILY MEDICINE CLINIC | Facility: CLINIC | Age: 68
End: 2023-09-29
Payer: MEDICARE

## 2023-09-29 VITALS
RESPIRATION RATE: 16 BRPM | SYSTOLIC BLOOD PRESSURE: 122 MMHG | HEART RATE: 90 BPM | WEIGHT: 209.25 LBS | HEIGHT: 64 IN | BODY MASS INDEX: 35.72 KG/M2 | OXYGEN SATURATION: 98 % | DIASTOLIC BLOOD PRESSURE: 66 MMHG

## 2023-09-29 DIAGNOSIS — R39.12 BENIGN PROSTATIC HYPERPLASIA WITH WEAK URINARY STREAM: ICD-10-CM

## 2023-09-29 DIAGNOSIS — N40.1 BENIGN PROSTATIC HYPERPLASIA WITH WEAK URINARY STREAM: ICD-10-CM

## 2023-09-29 DIAGNOSIS — E11.65 UNCONTROLLED TYPE 2 DIABETES MELLITUS WITH HYPERGLYCEMIA (HCC): Primary | ICD-10-CM

## 2023-09-29 DIAGNOSIS — I10 ESSENTIAL HYPERTENSION, BENIGN: ICD-10-CM

## 2023-09-29 PROCEDURE — 99214 OFFICE O/P EST MOD 30 MIN: CPT | Performed by: FAMILY MEDICINE

## 2023-09-29 RX ORDER — TAMSULOSIN HYDROCHLORIDE 0.4 MG/1
0.4 CAPSULE ORAL DAILY
Qty: 90 CAPSULE | Refills: 1 | Status: SHIPPED | OUTPATIENT
Start: 2023-09-29 | End: 2024-03-27

## 2023-09-29 RX ORDER — HYDROCHLOROTHIAZIDE 25 MG/1
25 TABLET ORAL DAILY
Qty: 90 TABLET | Refills: 3 | Status: SHIPPED | OUTPATIENT
Start: 2023-09-29

## 2023-10-19 ENCOUNTER — IMMUNIZATION (OUTPATIENT)
Dept: FAMILY MEDICINE CLINIC | Facility: CLINIC | Age: 68
End: 2023-10-19
Payer: MEDICARE

## 2023-10-19 DIAGNOSIS — Z23 NEED FOR VACCINATION: Primary | ICD-10-CM

## 2023-10-19 PROCEDURE — G0008 ADMIN INFLUENZA VIRUS VAC: HCPCS | Performed by: FAMILY MEDICINE

## 2023-10-19 PROCEDURE — 90662 IIV NO PRSV INCREASED AG IM: CPT | Performed by: FAMILY MEDICINE

## 2023-11-21 ENCOUNTER — TELEMEDICINE (OUTPATIENT)
Dept: FAMILY MEDICINE CLINIC | Facility: CLINIC | Age: 68
End: 2023-11-21
Payer: MEDICARE

## 2023-11-21 DIAGNOSIS — U07.1 COVID-19: Primary | ICD-10-CM

## 2023-11-21 RX ORDER — AMOXICILLIN AND CLAVULANATE POTASSIUM 875; 125 MG/1; MG/1
1 TABLET, FILM COATED ORAL 2 TIMES DAILY
Qty: 14 TABLET | Refills: 0 | Status: SHIPPED | OUTPATIENT
Start: 2023-11-21 | End: 2023-11-28

## 2023-11-28 ENCOUNTER — TELEPHONE (OUTPATIENT)
Dept: FAMILY MEDICINE CLINIC | Facility: CLINIC | Age: 68
End: 2023-11-28

## 2023-11-28 RX ORDER — AMOXICILLIN AND CLAVULANATE POTASSIUM 875; 125 MG/1; MG/1
1 TABLET, FILM COATED ORAL 2 TIMES DAILY
Qty: 6 TABLET | Refills: 0 | Status: SHIPPED | OUTPATIENT
Start: 2023-11-28 | End: 2023-11-28

## 2023-11-28 RX ORDER — AMOXICILLIN AND CLAVULANATE POTASSIUM 875; 125 MG/1; MG/1
1 TABLET, FILM COATED ORAL 2 TIMES DAILY
Qty: 6 TABLET | Refills: 0 | Status: SHIPPED | OUTPATIENT
Start: 2023-11-28

## 2023-11-28 NOTE — TELEPHONE ENCOUNTER
LOV was telemedicine on 11/21/23 for COVID and sinusitis. Pt started taking Augmentin on 11/23/23. Pt is concerned because he will be going out of town on 11/30/23 and he will be out of Augmentin on 12/1/23. Pt states that he is not improving enough. Continues to have PND. Fullness in forehead and cheeks, Dry cough. Afebrile. He is taking Sudafed and plenty of fluids. Recommend warm steam showers. Please advise.     Routed to [place] [id number] Skin Substitute Text: The defect edges were debeveled with a #15 scalpel blade.  Given the location of the defect, shape of the defect and the proximity to free margins a skin substitute graft was deemed most appropriate.  The graft material was trimmed to fit the size of the defect. The graft was then placed in the primary defect and oriented appropriately.

## 2023-11-28 NOTE — TELEPHONE ENCOUNTER
Patient came in to see [facility] [place] and she prescribed amoxicillin clavulanate 875-125 MG Oral Tab he is feeling a little better but not near what he should feel. Does he need a strong antibiotic or he said do I need to be seen again. He is also leaving out of town on Thursday morning.      [facility] [address], [facility] [address], [phone], [phone]

## 2024-01-05 ENCOUNTER — TELEPHONE (OUTPATIENT)
Dept: FAMILY MEDICINE CLINIC | Facility: CLINIC | Age: 69
End: 2024-01-05

## 2024-01-05 DIAGNOSIS — I10 ESSENTIAL HYPERTENSION, BENIGN: ICD-10-CM

## 2024-01-05 DIAGNOSIS — N40.1 BENIGN PROSTATIC HYPERPLASIA WITH WEAK URINARY STREAM: ICD-10-CM

## 2024-01-05 DIAGNOSIS — R39.12 BENIGN PROSTATIC HYPERPLASIA WITH WEAK URINARY STREAM: ICD-10-CM

## 2024-01-05 DIAGNOSIS — E11.9 TYPE 2 DIABETES MELLITUS WITHOUT COMPLICATION, WITHOUT LONG-TERM CURRENT USE OF INSULIN (HCC): ICD-10-CM

## 2024-01-05 DIAGNOSIS — E78.5 HYPERLIPIDEMIA LDL GOAL <100: ICD-10-CM

## 2024-01-05 RX ORDER — HYDROCHLOROTHIAZIDE 25 MG/1
25 TABLET ORAL DAILY
Qty: 90 TABLET | Refills: 3 | Status: SHIPPED | OUTPATIENT
Start: 2024-01-05

## 2024-01-05 RX ORDER — TAMSULOSIN HYDROCHLORIDE 0.4 MG/1
0.4 CAPSULE ORAL DAILY
Qty: 90 CAPSULE | Refills: 1 | Status: SHIPPED | OUTPATIENT
Start: 2024-01-05 | End: 2024-07-03

## 2024-01-05 RX ORDER — ROSUVASTATIN CALCIUM 10 MG/1
10 TABLET, COATED ORAL NIGHTLY
Qty: 90 TABLET | Refills: 3 | Status: SHIPPED | OUTPATIENT
Start: 2024-01-05

## 2024-01-05 RX ORDER — LOSARTAN POTASSIUM 100 MG/1
100 TABLET ORAL DAILY
Qty: 90 TABLET | Refills: 3 | Status: SHIPPED | OUTPATIENT
Start: 2024-01-05

## 2024-01-05 RX ORDER — FENOFIBRATE 145 MG/1
TABLET, COATED ORAL
Qty: 90 TABLET | Refills: 3 | Status: SHIPPED | OUTPATIENT
Start: 2024-01-05

## 2024-01-05 RX ORDER — GLIMEPIRIDE 4 MG/1
4 TABLET ORAL 2 TIMES DAILY
Qty: 180 TABLET | Refills: 3 | Status: SHIPPED | OUTPATIENT
Start: 2024-01-05

## 2024-01-05 NOTE — TELEPHONE ENCOUNTER
Pt call for a medication refill 90 days supply       hydroCHLOROthiazide 25 MG Oral Tab     tamsulosin 0.4 MG Oral Cap     glimepiride 4 MG Oral Tab     rosuvastatin 10 MG Oral Tab     JANUVIA 100 MG Oral Tab     FENOFIBRATE 145 MG Oral Tab                                                                  OMEPRAZOLE 20   LOSARTAN 100 MG Oral Tab       METFORMIN HCL 1000 MG Oral Tab         [facility]  [address]    [facility] [address]    [phone]

## 2024-01-10 DIAGNOSIS — K21.9 GASTROESOPHAGEAL REFLUX DISEASE: ICD-10-CM

## 2024-01-10 RX ORDER — OMEPRAZOLE 20 MG/1
CAPSULE, DELAYED RELEASE ORAL
Qty: 90 CAPSULE | Refills: 3 | Status: SHIPPED | OUTPATIENT
Start: 2024-01-10

## 2024-01-10 NOTE — TELEPHONE ENCOUNTER
Pt calling for medication refill its the only one that dr Tomlinson didn't fill   OMEPRAZOLE 20 MG Oral Capsule   [facility]

## 2024-04-15 ENCOUNTER — TELEPHONE (OUTPATIENT)
Dept: FAMILY MEDICINE CLINIC | Facility: CLINIC | Age: 69
End: 2024-04-15

## 2024-04-15 DIAGNOSIS — E11.9 TYPE 2 DIABETES MELLITUS WITHOUT COMPLICATION, WITHOUT LONG-TERM CURRENT USE OF INSULIN (HCC): Primary | ICD-10-CM

## 2024-04-15 DIAGNOSIS — N40.1 BPH ASSOCIATED WITH NOCTURIA: ICD-10-CM

## 2024-04-15 DIAGNOSIS — M10.9 GOUTY ARTHROPATHY: ICD-10-CM

## 2024-04-15 DIAGNOSIS — E78.5 HYPERLIPIDEMIA LDL GOAL <100: ICD-10-CM

## 2024-04-15 DIAGNOSIS — R35.1 BPH ASSOCIATED WITH NOCTURIA: ICD-10-CM

## 2024-04-15 NOTE — TELEPHONE ENCOUNTER
Please enter lab orders for the patient's upcoming physical appointment.     Physical scheduled:      Preferred lab: [facility]     The patient has been notified to complete fasting labs prior to their physical appointment.

## 2024-04-18 ENCOUNTER — OFFICE VISIT (OUTPATIENT)
Dept: FAMILY MEDICINE CLINIC | Facility: CLINIC | Age: 69
End: 2024-04-18
Payer: MEDICARE

## 2024-04-18 ENCOUNTER — TELEPHONE (OUTPATIENT)
Dept: FAMILY MEDICINE CLINIC | Facility: CLINIC | Age: 69
End: 2024-04-18

## 2024-04-18 VITALS
SYSTOLIC BLOOD PRESSURE: 156 MMHG | DIASTOLIC BLOOD PRESSURE: 70 MMHG | WEIGHT: 209 LBS | HEIGHT: 64 IN | BODY MASS INDEX: 35.68 KG/M2

## 2024-04-18 DIAGNOSIS — J01.00 ACUTE NON-RECURRENT MAXILLARY SINUSITIS: Primary | ICD-10-CM

## 2024-04-18 PROCEDURE — 99213 OFFICE O/P EST LOW 20 MIN: CPT | Performed by: FAMILY MEDICINE

## 2024-04-18 RX ORDER — AMOXICILLIN AND CLAVULANATE POTASSIUM 875; 125 MG/1; MG/1
1 TABLET, FILM COATED ORAL 2 TIMES DAILY
Qty: 20 TABLET | Refills: 0 | Status: SHIPPED | OUTPATIENT
Start: 2024-04-18 | End: 2024-04-28

## 2024-04-18 RX ORDER — PREDNISONE 20 MG/1
40 TABLET ORAL DAILY
Qty: 10 TABLET | Refills: 0 | Status: SHIPPED | OUTPATIENT
Start: 2024-04-18 | End: 2024-04-23

## 2024-04-18 NOTE — TELEPHONE ENCOUNTER
Pt calling has cough sinus pressure lots of mucus coming out can Dr Tomlinson can prescribe medication,   I'm living of town tomorrow morning if can be tonight someone can call me today.          Send to   [id number] - [place], [place] - [address]  AT [facility]

## 2024-04-18 NOTE — PROGRESS NOTES
Chief Complaint   Patient presents with    Sinus Problem      HPI:   Allen Amezcua is a 68 year old male who presents to the office for sinus congestion.  Leaving for cruise tomorrow.   Symptoms started over the weekend.  Mucus thickened over time.    Vocal changes.   Sunday the mucus was discolored.  Yellow.  Cough.  Sinus pressure/headache.            REVIEW OF SYSTEMS:   Pertinent items are noted in HPI.  EXAM:   /70   Ht 5' 4\" (1.626 m)   Wt 209 lb (94.8 kg)   BMI 35.87 kg/m²     General appearance - alert, well appearing, and in no distress  Eyes - pupils equal and reactive, extraocular eye movements intact  Ears - bilateral TM's and external ear canals normal  Nose - normal and patent, no erythema, discharge or polyps and sinus tenderness noted frontal and maxillary  Mouth - mucous membranes moist, pharynx normal without lesions  Neck - supple, no significant adenopathy  Chest - clear to auscultation, no wheezes, rales or rhonchi, symmetric air entry  Heart - normal rate, regular rhythm, normal S1, S2, no murmurs, rubs, clicks or gallops  ASSESSMENT AND PLAN:     Allen Amezcua was seen in the office today:  had concerns including Sinus Problem.    1. Acute non-recurrent maxillary sinusitis  Sinus infection  Start Augmentin  Some mild wheeze on pulm.  If this persists, consider taking prednisone.   - amoxicillin clavulanate 875-125 MG Oral Tab; Take 1 tablet by mouth 2 (two) times daily for 10 days.  Dispense: 20 tablet; Refill: 0  - predniSONE 20 MG Oral Tab; Take 2 tablets (40 mg total) by mouth daily for 5 days.  Dispense: 10 tablet; Refill: 0        Geovanny Tomlinson M.D.   [facility]  04/18/24

## 2024-04-18 NOTE — TELEPHONE ENCOUNTER
Called and talked to patient noticed symptoms last Saturday having cough with nasal congestion not getting better leaving on a cruise tomorrow and he does not want to be ill for the whole cruise would like to know if Dr rogers will prescribe something for him

## 2024-05-10 LAB
ALBUMIN/GLOBULIN RATIO: 1.7 (CALC) (ref 1–2.5)
ALBUMIN: 4.3 G/DL (ref 3.6–5.1)
ALKALINE PHOSPHATASE: 58 U/L (ref 35–144)
ALT: 42 U/L (ref 9–46)
AST: 41 U/L (ref 10–35)
BILIRUBIN, TOTAL: 0.5 MG/DL (ref 0.2–1.2)
BUN: 21 MG/DL (ref 7–25)
CALCIUM: 10.3 MG/DL (ref 8.6–10.3)
CARBON DIOXIDE: 28 MMOL/L (ref 20–32)
CHLORIDE: 101 MMOL/L (ref 98–110)
CHOL/HDLC RATIO: 4.1 (CALC)
CHOLESTEROL, TOTAL: 157 MG/DL
CREATININE, RANDOM URINE: 212 MG/DL (ref 20–320)
CREATININE: 1.18 MG/DL (ref 0.7–1.28)
DIRECT LDL: 79 MG/DL
EGFR: 66 ML/MIN/1.73M2
GLOBULIN: 2.5 G/DL (CALC) (ref 1.9–3.7)
GLUCOSE: 182 MG/DL (ref 65–99)
HDL CHOLESTEROL: 38 MG/DL
HEMOGLOBIN A1C: 8.3 % OF TOTAL HGB
LDL-CHOLESTEROL: 75 MG/DL (CALC)
MICROALBUMIN/CREATININE RATIO, RANDOM URINE: 10 MG/G CREAT
MICROALBUMIN: 2.2 MG/DL
NON-HDL CHOLESTEROL: 119 MG/DL (CALC)
POTASSIUM: 4.3 MMOL/L (ref 3.5–5.3)
PROTEIN, TOTAL: 6.8 G/DL (ref 6.1–8.1)
SODIUM: 138 MMOL/L (ref 135–146)
TOTAL PSA: 2.6 NG/ML
TRIGLYCERIDES: 367 MG/DL
URIC ACID: 4.8 MG/DL (ref 4–8)

## 2024-05-14 ENCOUNTER — OFFICE VISIT (OUTPATIENT)
Dept: FAMILY MEDICINE CLINIC | Facility: CLINIC | Age: 69
End: 2024-05-14

## 2024-05-14 VITALS
OXYGEN SATURATION: 98 % | DIASTOLIC BLOOD PRESSURE: 68 MMHG | WEIGHT: 206.5 LBS | RESPIRATION RATE: 13 BRPM | BODY MASS INDEX: 35.26 KG/M2 | SYSTOLIC BLOOD PRESSURE: 138 MMHG | HEART RATE: 81 BPM | HEIGHT: 64 IN

## 2024-05-14 DIAGNOSIS — M10.9 GOUTY ARTHROPATHY: ICD-10-CM

## 2024-05-14 DIAGNOSIS — G47.33 OBSTRUCTIVE SLEEP APNEA SYNDROME: ICD-10-CM

## 2024-05-14 DIAGNOSIS — E11.65 UNCONTROLLED TYPE 2 DIABETES MELLITUS WITH HYPERGLYCEMIA (HCC): ICD-10-CM

## 2024-05-14 DIAGNOSIS — E78.5 HYPERLIPIDEMIA LDL GOAL <100: ICD-10-CM

## 2024-05-14 DIAGNOSIS — Z00.00 ENCOUNTER FOR ANNUAL HEALTH EXAMINATION: Primary | ICD-10-CM

## 2024-05-14 DIAGNOSIS — I10 ESSENTIAL HYPERTENSION, BENIGN: ICD-10-CM

## 2024-05-14 DIAGNOSIS — E66.01 SEVERE OBESITY (BMI 35.0-39.9) WITH COMORBIDITY (HCC): ICD-10-CM

## 2024-05-14 DIAGNOSIS — Z12.11 COLON CANCER SCREENING: ICD-10-CM

## 2024-05-14 PROCEDURE — G0439 PPPS, SUBSEQ VISIT: HCPCS | Performed by: FAMILY MEDICINE

## 2024-05-14 NOTE — PROGRESS NOTES
Subjective:   Allen Amezcua is a 68 year old male who presents for a Medicare Subsequent Annual Wellness visit (Pt already had Initial Annual Wellness) and scheduled follow up of multiple significant but stable problems.   Preventative Screening  Colonoscopy - FOBT 4/2023.  Due again  Prostate - 2.6  Immunizations - PNA UTD x 2.  TDaP 2015.  Flu UTD.      DM - uncontrolled with A1C 8.3.  baseline A1C seems to be int he 7.8-8.3 range.  Taking glimepiride, metformin, Januvia. AM BS tends to be around 130 the last few weeks.  60 day average was 151.      HTN - taking HCTZ, losartan.  BP just a bit elevated initially today.      HLD - on rosuvastatin 10mg.  Lipids at goal with LDL 79, but TG remains significantly elevated (but better than past).  Also on fenofibrate.      Gout - no issues.  No preventative methods.  Last gout attack a decade or more.      History/Other:   Fall Risk Assessment:   He has been screened for Falls and is low risk.      Cognitive Assessment:   He had a completely normal cognitive assessment - see flowsheet entries     Functional Ability/Status:   Allen Amezcua has a completely normal functional assessment. See flowsheet for details.      Depression Screening (PHQ-2/PHQ-9): PHQ-2 SCORE: 0  , done 5/14/2024             Advanced Directives:   He does have a Living Will but we do NOT have it on file in Epic.    He does have a POA but we do NOT have it on file in Epic.    Discussed Advance Care Planning with patient (and family/surrogate if present). Standard forms made available to patient in After Visit Summary.      Patient Active Problem List   Diagnosis    Type 2 diabetes mellitus without complication, without long-term current use of insulin (HCC)    Hyperlipidemia LDL goal <100    Obstructive sleep apnea syndrome    Gouty arthropathy    Essential hypertension, benign    Diverticula of colon    Status post left knee replacement    Severe obesity (BMI 35.0-39.9) with comorbidity (HCC)    Morbid  (severe) obesity due to excess calories (HCC)     Allergies:  He is allergic to lisinopril.    Current Medications:  Outpatient Medications Marked as Taking for the 5/14/24 encounter (Office Visit) with Geovanny Tomlinson MD   Medication Sig    omeprazole 20 MG Oral Capsule Delayed Release TAKE 1 CAPSULE EVERY       MORNING    hydroCHLOROthiazide 25 MG Oral Tab Take 1 tablet (25 mg total) by mouth daily.    tamsulosin 0.4 MG Oral Cap Take 1 capsule (0.4 mg total) by mouth daily.    glimepiride 4 MG Oral Tab Take 1 tablet (4 mg total) by mouth 2 (two) times daily.    rosuvastatin 10 MG Oral Tab Take 1 tablet (10 mg total) by mouth nightly.    fenofibrate 145 MG Oral Tab TAKE 1 TABLET ONCE DAILY    SITagliptin Phosphate (JANUVIA) 100 MG Oral Tab Take 1 tablet (100 mg total) by mouth daily.    metFORMIN HCl 1000 MG Oral Tab TAKE 1 TABLET TWICE DAILY  WITH MEALS    losartan 100 MG Oral Tab Take 1 tablet (100 mg total) by mouth daily.       Medical History:  He  has a past medical history of Essential hypertension, benign (11/22/2012), Gouty arthropathy (6/29/2012), Hyperlipidemia (6/29/2012), Sleep apnea (6/29/2012), Type II diabetes mellitus (6/29/2012), and Visual impairment.  Surgical History:  He  has a past surgical history that includes other surgical history (1990); hernia surgery; tonsillectomy; General sleep study (3/10/03); knee replacement surgery (11/2014); and repair ing hernia,5+y/o,reducibl.   Family History:  His family history includes Heart Disorder in his father.  Social History:  He  reports that he quit smoking about 34 years ago. His smoking use included cigarettes. He started smoking about 54 years ago. He has a 20 pack-year smoking history. He has never used smokeless tobacco. He reports current alcohol use of about 6.0 - 9.0 standard drinks of alcohol per week. He reports that he does not use drugs.    Tobacco:  He smoked tobacco in the past but quit greater than 12 months ago.  Social History      Tobacco Use   Smoking Status Former    Current packs/day: 0.00    Average packs/day: 1 pack/day for 20.0 years (20.0 ttl pk-yrs)    Types: Cigarettes    Start date: 1970    Quit date: 1990    Years since quittin.3   Smokeless Tobacco Never        CAGE Alcohol Screen:   CAGE screening score of 0 on 2024, showing low risk of alcohol abuse.      Patient Care Team:  Geovanny Tomlinson MD as PCP - General (Family Medicine)  Mario Up PT as Physical Therapist  Jesse Casey MD as Consulting Physician (OPHTHALMOLOGY)    Review of Systems  Constitutional: negative  Eyes: negative  Ears, nose, mouth, throat, and face: negative  Respiratory: negative  Cardiovascular: negative  Gastrointestinal: negative  Genitourinary: negative  Neurological: negative      Objective:   Physical Exam  General Appearance:  Alert, cooperative, no distress, appears stated age   Head:  Normocephalic, without obvious abnormality, atraumatic   Eyes:  PERRL, conjunctiva/corneas clear, EOM's intact   Neck: Supple, symmetrical, trachea midline, no adenopathy   Back:   Symmetric, no curvature, ROM normal, no CVA tenderness   Lungs:   Clear to auscultation bilaterally, respirations unlabored   Chest Wall:  No tenderness or deformity   Heart:  Regular rate and rhythm, S1, S2 normal, no murmur, rub or gallop   Abdomen:   Soft, non-tender, bowel sounds active all four quadrants,  no masses, no organomegaly   Extremities: Bilateral barefoot skin diabetic exam is normal, visualized feet and the appearance is normal.  Bilateral monofilament/sensation of both feet is normal.  Pulsation pedal pulse exam of both lower legs/feet is normal as well.   Pulses: 2+ and symmetric   Skin: Skin color, texture, turgor normal, no rashes or lesions   Neurologic: Normal      /68   Pulse 81   Resp 13   Ht 5' 4\" (1.626 m)   Wt 206 lb 8 oz (93.7 kg)   SpO2 98%   BMI 35.45 kg/m²  Estimated body mass index is 35.45 kg/m² as  calculated from the following:    Height as of this encounter: 5' 4\" (1.626 m).    Weight as of this encounter: 206 lb 8 oz (93.7 kg).    Medicare Hearing Assessment:   Hearing Screening    Time taken: 5/14/2024  8:05 AM  Screening Method: Whisper Test  Whisper Test Result: Pass         Visual Acuity:   Right Eye Visual Acuity: Uncorrected Right Eye Chart Acuity: 20/50   Left Eye Visual Acuity: Uncorrected Left Eye Chart Acuity: 20/40   Both Eyes Visual Acuity: Uncorrected Both Eyes Chart Acuity: 20/40   Able To Tolerate Visual Acuity: Yes        Assessment & Plan:     Allen Amezcua was seen in the office today:  had concerns including Health Maintenance (MAW).    1. Encounter for annual health examination  Overall fair  Feeling well, but sugars remain elevated.  C-scope due - prefers stool testing  PSA normal  Immunizations discussed.  Eligible for PNA 20 and shingles.     2. Uncontrolled type 2 diabetes mellitus with hyperglycemia (HCC)  A1C back up despite AM BS being pretty good  Working on food choices, adding in exercise  Wishes to keep meds stable now and recheck in three months.   Considering adding Jardiance or ozempic in place of glimepiride.     3. Essential hypertension, benign  BP controlled - high normal  Stable meds     4. Hyperlipidemia LDL goal <100  On statin  Lipids better since adding this     5. Severe obesity (BMI 35.0-39.9) with comorbidity (HCC)  BMI 35  Working on healthy diet, exercise .    6. Obstructive sleep apnea syndrome  Ongoing stable.     7. Gouty arthropathy  Ivana uric acid levels  No preventative meds    8. Colon cancer screening  Due  FIT testing ordered  - INSURE FECAL GLOBIN by [facility] [[id number]] [Q]            The patient indicates understanding of these issues and agrees to the plan.  Reinforced healthy diet, lifestyle, and exercise.      Return in about 3 months (around 8/14/2024) for Diabetic follow-up.     Geovanny Tomlinson MD, 5/14/2024     Supplementary Documentation:    General Health:  Has your appetite been poor?: No  Type of Diet: Balanced  How does the patient maintain a good energy level?: Appropriate Exercise  How would you describe your daily physical activity?: Moderate  How would you describe your current health state?: Fair  How do you maintain positive mental well-being?: Social Interaction;Games;Visiting Friends;Visiting Family  On a scale of 0 to 10, with 0 being no pain and 10 being severe pain, what is your pain level?: 1 - (Mild)  In the past six months, have you experienced urine leakage?: 0-No  At any time do you feel concerned for the safety/well-being of yourself and/or your children, in your home or elsewhere?: No  Have you had any immunizations at another office such as Influenza, Hepatitis B, Tetanus, or Pneumococcal?: Lisa Amezcua's SCREENING SCHEDULE   Tests on this list are recommended by your physician but may not be covered, or covered at this frequency, by your insurer.   Please check with your insurance carrier before scheduling to verify coverage.   PREVENTATIVE SERVICES FREQUENCY &  COVERAGE DETAILS LAST COMPLETION DATE   Diabetes Screening    Fasting Blood Sugar / Glucose    One screening every 12 months if never tested or if previously tested but not diagnosed with pre-diabetes   One screening every 6 months if diagnosed with pre-diabetes Lab Results   Component Value Date     (H) 05/08/2024        Cardiovascular Disease Screening    Lipid Panel  Cholesterol  Lipoprotein (HDL)  Triglycerides Covered every 5 years for all Medicare beneficiaries without apparent signs or symptoms of cardiovascular disease Lab Results   Component Value Date    CHOLEST 157 05/08/2024    HDL 38 (L) 05/08/2024    LDL 75 05/08/2024    LDLD 79 05/08/2024    TRIG 367 (H) 05/08/2024         Electrocardiogram (EKG)   [facility], and non-screening if indicated for medical reasons 11/25/2020      Ultrasound Screening for  Abdominal Aortic Aneurysm (AAA) Covered once in a lifetime for one of the following risk factors    Men who are 65-75 years old and have ever smoked    Anyone with a family history -     Colorectal Cancer Screening  Covered for ages 50-85; only need ONE of the following:    Colonoscopy   Covered every 10 years    Covered every 2 years if patient is at high risk or previous colonoscopy was abnormal 03/17/2016    Health Maintenance   Topic Date Due    Colorectal Cancer Screening  04/09/2023       Flexible Sigmoidoscopy   Covered every 4 years -    Fecal Occult Blood Test Covered annually -   Prostate Cancer Screening    Prostate-Specific Antigen (PSA) Annually No results found for: \"PSA\"  Health Maintenance   Topic Date Due    PSA  05/08/2026      Immunizations    Influenza Covered once per flu season  Please get every year 10/19/2023  No recommendations at this time    Pneumococcal Each vaccine ([id number] & Ukjlmuakd77) covered once after 65 Prevnar 13: 01/08/2020    Jrjbvnwce82: 09/21/2016     Pneumococcal Vaccination(3 of 3 - PPSV23 or PCV20) due on 09/21/2021    Hepatitis B One screening covered for patients with certain risk factors   -  No recommendations at this time    Tetanus Toxoid Not covered by Medicare Part B unless medically necessary (cut with metal); may be covered with your pharmacy prescription benefits -    Tetanus, Diptheria and Pertusis TD and TDaP Not covered by Medicare Part B -  No recommendations at this time    Zoster Not covered by Medicare Part B; may be covered with your pharmacy  prescription benefits -  Zoster Vaccines(1 of 2) Never done     Diabetes      Hemoglobin A1C Annually; if last result is elevated, may be asked to retest more frequently.    Medicare covers every 3 months Lab Results   Component Value Date    A1C 8.3 (H) 05/08/2024       No recommendations at this time    Creat/alb ratio Annually No results found for: \"MICROALBCREA\", \"MALBCRECALC\"    LDL Annually Lab Results    Component Value Date    LDL 75 05/08/2024       Dilated Eye Exam Annually Last Diabetic Eye Exam:  Last Dilated Eye Exam: 09/25/23  Eye Exam shows Diabetic Retinopathy?: No       Annual Monitoring of Persistent Medications (ACE/ARB, digoxin diuretics, anticonvulsants)    Potassium Annually Lab Results   Component Value Date    K 4.3 05/08/2024         Creatinine   Annually Lab Results   Component Value Date    CREATSERUM 1.18 05/08/2024         BUN Annually Lab Results   Component Value Date    BUN 21 05/08/2024       Drug Serum Conc Annually No results found for: \"DIGOXIN\", \"DIG\", \"VALP\"

## 2024-05-14 NOTE — PATIENT INSTRUCTIONS
Allen Amezcua's SCREENING SCHEDULE   Tests on this list are recommended by your physician but may not be covered, or covered at this frequency, by your insurer.   Please check with your insurance carrier before scheduling to verify coverage.   PREVENTATIVE SERVICES FREQUENCY &  COVERAGE DETAILS LAST COMPLETION DATE   Diabetes Screening    Fasting Blood Sugar / Glucose    One screening every 12 months if never tested or if previously tested but not diagnosed with pre-diabetes   One screening every 6 months if diagnosed with pre-diabetes Lab Results   Component Value Date     (H) 05/08/2024        Cardiovascular Disease Screening    Lipid Panel  Cholesterol  Lipoprotein (HDL)  Triglycerides Covered every 5 years for all Medicare beneficiaries without apparent signs or symptoms of cardiovascular disease Lab Results   Component Value Date    CHOLEST 157 05/08/2024    HDL 38 (L) 05/08/2024    LDL 75 05/08/2024    LDLD 79 05/08/2024    TRIG 367 (H) 05/08/2024         Electrocardiogram (EKG)   Covered if needed at Welcome to Medicare, and non-screening if indicated for medical reasons 11/25/2020      Ultrasound Screening for Abdominal Aortic Aneurysm (AAA) Covered once in a lifetime for one of the following risk factors   • Men who are 65-75 years old and have ever smoked   • Anyone with a family history -     Colorectal Cancer Screening  Covered for ages 50-85; only need ONE of the following:    Colonoscopy   Covered every 10 years    Covered every 2 years if patient is at high risk or previous colonoscopy was abnormal 03/17/2016    Health Maintenance   Topic Date Due   • Colorectal Cancer Screening  04/09/2023       Flexible Sigmoidoscopy   Covered every 4 years -    Fecal Occult Blood Test Covered annually -   Prostate Cancer Screening    Prostate-Specific Antigen (PSA) Annually No results found for: \"PSA\"  Health Maintenance   Topic Date Due   • PSA  05/08/2026      Immunizations    Influenza Covered once per flu  season  Please get every year 10/19/2023  No recommendations at this time    Pneumococcal Each vaccine ([id number] & Yozbjhzaf88) covered once after 65 Prevnar 13: 01/08/2020    [id number]: 09/21/2016     Pneumococcal Vaccination(3 of 3 - PPSV23 or PCV20) due on 09/21/2021    Hepatitis B One screening covered for patients with certain risk factors   -  No recommendations at this time    Tetanus Toxoid Not covered by Medicare Part B unless medically necessary (cut with metal); may be covered with your pharmacy prescription benefits -    Tetanus, Diptheria and Pertusis TD and TDaP Not covered by Medicare Part B -  No recommendations at this time    Zoster Not covered by Medicare Part B; may be covered with your pharmacy  prescription benefits -  Zoster Vaccines(1 of 2) Never done     Diabetes      Hemoglobin A1C Annually; if last result is elevated, may be asked to retest more frequently.    Medicare covers every 3 months Lab Results   Component Value Date    A1C 8.3 (H) 05/08/2024       No recommendations at this time    Creat/alb ratio Annually No results found for: \"MICROALBCREA\", \"MALBCRECALC\"    LDL Annually Lab Results   Component Value Date    LDL 75 05/08/2024       Dilated Eye Exam Annually [unfilled]     Annual Monitoring of Persistent Medications (ACE/ARB, digoxin diuretics, anticonvulsants)    Potassium Annually Lab Results   Component Value Date    K 4.3 05/08/2024         Creatinine   Annually Lab Results   Component Value Date    CREATSERUM 1.18 05/08/2024         BUN Annually Lab Results   Component Value Date    BUN 21 05/08/2024       Drug Serum Conc Annually No results found for: \"DIGOXIN\", \"DIG\", \"VALP\"

## 2024-06-11 DIAGNOSIS — N40.1 BENIGN PROSTATIC HYPERPLASIA WITH WEAK URINARY STREAM: ICD-10-CM

## 2024-06-11 DIAGNOSIS — R39.12 BENIGN PROSTATIC HYPERPLASIA WITH WEAK URINARY STREAM: ICD-10-CM

## 2024-06-12 RX ORDER — TAMSULOSIN HYDROCHLORIDE 0.4 MG/1
0.4 CAPSULE ORAL DAILY
Qty: 90 CAPSULE | Refills: 3 | Status: SHIPPED | OUTPATIENT
Start: 2024-06-12

## 2024-06-12 NOTE — TELEPHONE ENCOUNTER
Requested Prescriptions     Pending Prescriptions Disp Refills    TAMSULOSIN 0.4 MG Oral Cap [Pharmacy Med Name: TAMSULOSIN HCL CAPS 0.4MG] 90 capsule 3     Sig: TAKE 1 CAPSULE DAILY     LOV 5/14/2024     Patient was asked to follow-up in: 3 months    Appointment scheduled: 8/20/2024 Geovanny Tomlinson MD     Medication refilled per protocol.

## 2024-08-20 ENCOUNTER — OFFICE VISIT (OUTPATIENT)
Dept: FAMILY MEDICINE CLINIC | Facility: CLINIC | Age: 69
End: 2024-08-20
Payer: MEDICARE

## 2024-08-20 ENCOUNTER — PATIENT MESSAGE (OUTPATIENT)
Dept: FAMILY MEDICINE CLINIC | Facility: CLINIC | Age: 69
End: 2024-08-20

## 2024-08-20 VITALS
WEIGHT: 208 LBS | DIASTOLIC BLOOD PRESSURE: 70 MMHG | HEART RATE: 68 BPM | HEIGHT: 64 IN | OXYGEN SATURATION: 98 % | BODY MASS INDEX: 35.51 KG/M2 | SYSTOLIC BLOOD PRESSURE: 130 MMHG | TEMPERATURE: 98 F

## 2024-08-20 DIAGNOSIS — E78.5 HYPERLIPIDEMIA LDL GOAL <100: ICD-10-CM

## 2024-08-20 DIAGNOSIS — I10 ESSENTIAL HYPERTENSION, BENIGN: ICD-10-CM

## 2024-08-20 DIAGNOSIS — E11.65 UNCONTROLLED TYPE 2 DIABETES MELLITUS WITH HYPERGLYCEMIA (HCC): Primary | ICD-10-CM

## 2024-08-20 LAB — HEMOGLOBIN A1C: 8.4 % (ref 4.3–5.6)

## 2024-08-20 PROCEDURE — 83036 HEMOGLOBIN GLYCOSYLATED A1C: CPT | Performed by: FAMILY MEDICINE

## 2024-08-20 PROCEDURE — 99214 OFFICE O/P EST MOD 30 MIN: CPT | Performed by: FAMILY MEDICINE

## 2024-08-20 NOTE — PROGRESS NOTES
Chief Complaint   Patient presents with    Follow - Up     Diabetes followup patient states no other issues.      HPI:   Allen Amezcua is a 68 year old male who presents for a diabetic visit.    Typical blood sugar levels are 200s+.  Not really sure why the malcom high consistently.  Can seem to go up and down in patterns.    Current diabetic medications are: metformin, Januvia, glimepiride.    Diet: up and down  Exercise: walking  Last Eye exam: 2023.  Last Foot exam: done previously    HTN - taking HCTZ, losartan.  BP controlled.      HLD - and high TG.  On Crestor, fenofibrate.  LDL at goal, 79 on labs in May.      Relevant Labs:   Chemistry Labs:   HEMOGLOBIN A1C (%)   Date Value   08/10/2021 7.9 (A)   07/15/2020 7.6 (A)   01/15/2019 6.2 (A)     HEMOGLOBIN A1c (% of total Hgb)   Date Value   2024 8.3 (H)   2023 8.4 (H)   2023 7.8 (H)      Lab Results   Component Value Date/Time    LDL 75 2024 07:12 AM    HDL 38 (L) 2024 07:12 AM    AST 41 (H) 2024 07:12 AM    ALT 42 2024 07:12 AM      Lab Results   Component Value Date/Time    CREATSERUM 1.18 2024 07:12 AM        Urine Studies: collected  Micro Albumen/Creatinine:    Lab Results   Component Value Date    MALBCREACALC 10 2024    MALBCREACALC 8 2023    MALBCREACALC 6 2021       Social History     Tobacco Use    Smoking status: Former     Current packs/day: 0.00     Average packs/day: 1 pack/day for 20.0 years (20.0 ttl pk-yrs)     Types: Cigarettes     Start date: 1970     Quit date: 1990     Years since quittin.6    Smokeless tobacco: Never   Substance Use Topics    Alcohol use: Yes     Alcohol/week: 6.0 - 9.0 standard drinks of alcohol     Types: 6 - 9 Standard drinks or equivalent per week     Comment: 3 servings 2-3 days a week       REVIEW OF SYSTEMS:     Constitutional: negative  Eyes: negative  Ears, nose, mouth, throat, and face: negative  Respiratory: negative  Cardiovascular:  negative  Gastrointestinal: negative  Genitourinary: negative  Neurological: negative    EXAM:     There were no vitals taken for this visit.  Wt Readings from Last 6 Encounters:   05/14/24 206 lb 8 oz (93.7 kg)   04/18/24 209 lb (94.8 kg)   09/29/23 209 lb 4 oz (94.9 kg)   03/14/23 213 lb (96.6 kg)   03/07/23 213 lb (96.6 kg)   09/21/22 211 lb (95.7 kg)       General: alert, well appearing, and in no distress  CV: regular rate and rhythm, no murmurs  Resp: clear to auscultation, no wheezes, rales or rhonchi, symmetric air entry  Abdomen: soft, nontender, nondistended, no masses or organomegaly  Neuro: alert, oriented, normal speech, no focal findings or movement disorder noted  Ext/Foot: no edema.     ASSESSMENT AND PLAN:     Allen Amezcua was seen in the office today:  had concerns including Follow - Up (Diabetes followup patient states no other issues.).    1. Uncontrolled type 2 diabetes mellitus with hyperglycemia (HCC)  Sugars remain uncontrolled  Need to change up the routine.  We discussed the once weekly injections vs. SGLT 2 inhibitor pills.  He will consider and let us know  Continue Januvia and metformin  Stop glimepiride at this time.   - HEMOGLOBIN A1C    2. Essential hypertension, benign  BP controlled  Stable meds    3. Hyperlipidemia LDL goal <100  On statin  Lipids at goal in May      Geovanny Tomlinson M.D.   EMG 3  08/20/24    Return December, for Diabetic follow-up.

## 2024-08-26 ENCOUNTER — PATIENT MESSAGE (OUTPATIENT)
Dept: FAMILY MEDICINE CLINIC | Facility: CLINIC | Age: 69
End: 2024-08-26

## 2024-10-08 ENCOUNTER — PATIENT MESSAGE (OUTPATIENT)
Dept: FAMILY MEDICINE CLINIC | Facility: CLINIC | Age: 69
End: 2024-10-08

## 2024-10-08 DIAGNOSIS — E11.65 UNCONTROLLED TYPE 2 DIABETES MELLITUS WITH HYPERGLYCEMIA (HCC): Primary | ICD-10-CM

## 2024-10-09 ENCOUNTER — TELEPHONE (OUTPATIENT)
Dept: FAMILY MEDICINE CLINIC | Facility: CLINIC | Age: 69
End: 2024-10-09

## 2024-10-09 DIAGNOSIS — E11.65 UNCONTROLLED TYPE 2 DIABETES MELLITUS WITH HYPERGLYCEMIA (HCC): ICD-10-CM

## 2024-10-09 NOTE — TELEPHONE ENCOUNTER
Pt is calling Dr. Tomlinson sent his jardiance to the wrong pharmacy he needs it to go to [facility] for 90 days and it was sent to [facility] in error.  Please resend.

## 2024-10-28 ENCOUNTER — OFFICE VISIT (OUTPATIENT)
Dept: FAMILY MEDICINE CLINIC | Facility: CLINIC | Age: 69
End: 2024-10-28
Payer: MEDICARE

## 2024-10-28 ENCOUNTER — TELEPHONE (OUTPATIENT)
Dept: ORTHOPEDICS CLINIC | Facility: CLINIC | Age: 69
End: 2024-10-28

## 2024-10-28 VITALS
DIASTOLIC BLOOD PRESSURE: 70 MMHG | SYSTOLIC BLOOD PRESSURE: 126 MMHG | BODY MASS INDEX: 36 KG/M2 | HEIGHT: 64 IN | RESPIRATION RATE: 16 BRPM

## 2024-10-28 DIAGNOSIS — M25.521 RIGHT ELBOW PAIN: Primary | ICD-10-CM

## 2024-10-28 DIAGNOSIS — M77.8 TRICEPS TENDONITIS: ICD-10-CM

## 2024-10-28 PROCEDURE — 99213 OFFICE O/P EST LOW 20 MIN: CPT | Performed by: FAMILY MEDICINE

## 2024-10-28 NOTE — PROGRESS NOTES
Chief Complaint   Patient presents with    Arm Pain     Patient here for follow up on arm injury      HPI:   Allen Amezcua is a 68 year old male who presents to the office for arm injury.  Occurred 3 weeks ago - hit arm on granite counter top.  Hurt, but nothing too much.    10 days later, the pain revved up and impacting him more.   Saw doctor in [place] - arm swelling from biceps through figners.  Very tender, limited ROM at the elbow.   Still has swelling just above the olecranon posteriorly.      Put on steroids.  Did XR.  No fractures.  Also on an antibiotics as she was concerned about the redness, and given tylenol and codeine.    Also taking OTC options.  The meds help, but the pains return off them.     Pains when flexing at the elbow and when extending.   REVIEW OF SYSTEMS:   Pertinent items are noted in HPI.  EXAM:   /70   Resp 16   Ht 5' 4\" (1.626 m)   BMI 35.70 kg/m²     General appearance - alert, well appearing, and in no distress  Extremities - swelling along the tendon of the triceps superiorly.  About 1/2 way up the back of the arm, over olecranon, and extending down forearm a few inches.   No skin color changes, but obvious fluid accumulation.  Limits the ROM  ASSESSMENT AND PLAN:     Allen Amezcua was seen in the office today:  had concerns including Arm Pain (Patient here for follow up on arm injury).    1. Right elbow pain  2. Triceps tendonitis  Continues fluid accumulation after the trauma several weeks ago  Still limiting his range of motion, having pain  The medicines did not have much of an impact  I would recommend using heat and ice to see if we can help cut down the swelling  Plan to see orthopedics.  May need to do further investigations to ensure no bony trauma  - Ortho Referral - [facility]        Geovanny Tomlinson M.D.   EMG 3  10/28/24

## 2024-10-30 ENCOUNTER — OFFICE VISIT (OUTPATIENT)
Dept: ORTHOPEDICS CLINIC | Facility: CLINIC | Age: 69
End: 2024-10-30
Payer: MEDICARE

## 2024-10-30 ENCOUNTER — HOSPITAL ENCOUNTER (OUTPATIENT)
Dept: GENERAL RADIOLOGY | Age: 69
Discharge: HOME | End: 2024-10-30
Attending: PHYSICIAN ASSISTANT
Payer: MEDICARE

## 2024-10-30 VITALS — BODY MASS INDEX: 32.44 KG/M2 | HEIGHT: 64 IN | WEIGHT: 190 LBS

## 2024-10-30 DIAGNOSIS — M70.21 OLECRANON BURSITIS OF RIGHT ELBOW: Primary | ICD-10-CM

## 2024-10-30 DIAGNOSIS — M25.521 RIGHT ELBOW PAIN: ICD-10-CM

## 2024-10-30 DIAGNOSIS — L03.818 CELLULITIS OF OTHER SPECIFIED SITE: ICD-10-CM

## 2024-10-30 PROCEDURE — 99213 OFFICE O/P EST LOW 20 MIN: CPT | Performed by: PHYSICIAN ASSISTANT

## 2024-10-30 PROCEDURE — 73080 X-RAY EXAM OF ELBOW: CPT | Performed by: PHYSICIAN ASSISTANT

## 2024-10-30 RX ORDER — CEPHALEXIN 500 MG/1
500 CAPSULE ORAL 2 TIMES DAILY
Qty: 20 CAPSULE | Refills: 0 | Status: SHIPPED | OUTPATIENT
Start: 2024-10-30 | End: 2024-11-09

## 2024-10-30 NOTE — H&P
[facility] - ORTHOPEDICS  [address] [place] [id number]  [phone]     NEW PATIENT VISIT - HISTORY AND PHYSICAL EXAMINATION     Name: Allen Amezcua   MRN: [id number]  Date: 10/30/2024     CC: Right  elbow pain.    REFERRED BY: Geovanny Tomlinson MD    HPI:   Allen Amezcua is a very pleasant 68 year old right-hand dominant male who presents today for evaluation, consultation, and management of a right elbow injury that occurred several weeks ago after he hit his arm on a [place] countertop.  He has had persistent pain discomfort and swelling.  He followed up with his primary care physician and presents today for further evaluation management.  He describes swelling, stiffness and discomfort.    PMH:   Past Medical History:    Essential hypertension, benign    Gouty arthropathy    Hyperlipidemia    Sleep apnea    Type II diabetes mellitus    Visual impairment    glasses       PAST SURGICAL HX:  Past Surgical History:   Procedure Laterality Date    General sleep study  3/10/03    Hernia surgery      Knee replacement surgery  11/2014    L knee by Mochel    Other surgical history  1990    LEFT KNEE ORIF TIBIAL PLATEAU    Repair ing hernia,5+y/o,reducibl      Tonsillectomy         FAMILY HX:  Family History   Problem Relation Age of Onset    Heart Disorder Father        ALLERGIES:  Lisinopril    MEDICATIONS:   Current Outpatient Medications   Medication Sig Dispense Refill    cephALEXin (KEFLEX) 500 MG Oral Cap Take 1 capsule (500 mg total) by mouth 2 (two) times daily for 10 days. 20 capsule 0    empagliflozin (JARDIANCE) 25 MG Oral Tab Take 1 tablet (25 mg total) by mouth daily. 90 tablet 1    TAMSULOSIN 0.4 MG Oral Cap TAKE 1 CAPSULE DAILY 90 capsule 3    omeprazole 20 MG Oral Capsule Delayed Release TAKE 1 CAPSULE EVERY       MORNING 90 capsule 3    hydroCHLOROthiazide 25 MG Oral Tab Take 1 tablet (25 mg total) by mouth daily. 90 tablet 3    rosuvastatin 10 MG Oral Tab Take 1 tablet  (10 mg total) by mouth nightly. 90 tablet 3    fenofibrate 145 MG Oral Tab TAKE 1 TABLET ONCE DAILY 90 tablet 3    SITagliptin Phosphate (JANUVIA) 100 MG Oral Tab Take 1 tablet (100 mg total) by mouth daily. 90 tablet 3    metFORMIN HCl 1000 MG Oral Tab TAKE 1 TABLET TWICE DAILY  WITH MEALS 180 tablet 3    losartan 100 MG Oral Tab Take 1 tablet (100 mg total) by mouth daily. 90 tablet 3       ROS: A complete 10 point review of systems performed and negative aside from the aforementioned per history of present illness.     SOCIAL HX:  Social History     Occupational History    Occupation: [employer]   Tobacco Use    Smoking status: Former     Current packs/day: 0.00     Average packs/day: 1 pack/day for 20.0 years (20.0 ttl pk-yrs)     Types: Cigarettes     Start date: 1970     Quit date: 1990     Years since quittin.8    Smokeless tobacco: Never   Vaping Use    Vaping status: Never Used   Substance and Sexual Activity    Alcohol use: Yes     Alcohol/week: 6.0 - 9.0 standard drinks of alcohol     Types: 6 - 9 Standard drinks or equivalent per week     Comment: 3 servings 2-3 days a week    Drug use: No    Sexual activity: Not on file         PE:   Vitals:    10/30/24 1028   Weight: 190 lb (86.2 kg)   Height: 5' 4\" (1.626 m)     Estimated body mass index is 32.61 kg/m² as calculated from the following:    Height as of this encounter: 5' 4\" (1.626 m).    Weight as of this encounter: 190 lb (86.2 kg).    Physical Exam  Constitutional:       Appearance: Normal appearance.   HENT:      Head: Normocephalic and atraumatic.   Eyes:      Extraocular Movements: Extraocular movements intact.   Neck:      Musculoskeletal: Normal range of motion and neck supple.   Cardiovascular:      Pulses: Normal pulses.   Pulmonary:      Effort: Pulmonary effort is normal. No respiratory distress.   Abdominal:      General: There is no distension.   Skin:     General: Skin is warm.      Capillary Refill: Capillary refill  takes less than 2 seconds.      Findings: No bruising.   Neurological:      General: No focal deficit present.      Mental Status: Alert.   Psychiatric:         Mood and Affect: Mood normal.     Examination of the right elbow  demonstrates:     Skin is intact, warm and dry.     Deformity:   none  Atrophy:   none      Palpation:     Medial Epicondyle Negative  Lateral Epicondyle Negative  Olecranon   Positive + bursitis, slight edema     ROM:   Flexion   110 vs 130 contralateral   Extension  full and symmetric  Pronation  full and symmetric    Strength:   Supraspinatus:   5/5  Subscapularis:   5/5  Infraspinatus/Teres: 5/5    Provocative Tests:   Moving Valgus Stress Test:  Negative  Biceps Hook Test:   Negative  Tinel's overlying Ulnar Nerve Negative    Neurovascular Upper Extremity (Bilateral)  Motor:    5/5 EPL, Finger Abduction, , Pinch, Deltoid  Sensation:   intact to light touch median, ulnar, radial and axillary nerve  Circulation:   Normal, 2+ radial pulse    The contralateral upper extremity is without limitation in range of motion or strength, no positive provocative maneuvers.     Radiographic Examination/Diagnostics:    I personally viewed, independently interpreted and radiology report was reviewed.    XR ELBOW, COMPLETE (MIN 3 VIEWS), RIGHT (CPT=73080)    Result Date: 10/30/2024  PROCEDURE:  XR ELBOW, COMPLETE (MIN 3 VIEWS), RIGHT (CPT=73080)  TECHNIQUE:  Three views were obtained.  COMPARISON:  None.  INDICATIONS:  M25.521 Right elbow pain.     Pain involving the extremity, after injury.    PATIENT STATED HISTORY: (As transcribed by Technologist)  Patient is having ortho evaluation. He has been having right elbow pain and swelling for 3.5 weeks, possible injury from hard counter.    FINDINGS:  Negative for fracture, dislocation, deformity or other acute bony abnormality. There is soft tissue swelling present considerable involving the posterior elbow at the olecranon but also extending above the  olecranon along the distal posterior humerus and also extending distally below the olecranon along the proximal posterior ulna, and on the frontal view involving the medial aspect of the elbow.  No joint effusion.             CONCLUSION:  Soft tissue swelling may reflect contusion in the setting of recent injury, differential also includes edema and cellulitis. No acute fracture or other acute bony process.  LOCATION:  [facility]    Dictated by (CST): Raphael Rizo MD on 10/30/2024 at 10:19 AM     Finalized by (CST): Raphael Rizo MD on 10/30/2024 at 10:20 AM         IMPRESSION: Allen Amezcua is a 68 year old Right hand dominant male with right elbow contusion consistent with cellulitis, improving and olecranon bursitis.     PLAN:   We had a detailed discussion outlining the etiology, anatomy, pathophysiology, and natural history of the patient's findings. Imaging was reviewed in detail and correlated to a 3-dimensional model of the patient's pathology.     We reviewed the treatment of this disease condition.  Fortunately, treatment is amenable to conservative treatment which we chose to optimize at today's visit.  We prescribed Kelfex 10 day course with compression sleeve and physical therapy.     We recommended physical therapy to aid in strengthening, range of motion, functional improvement, and return to baseline activity.  The patient had the opportunity to ask questions and all questions were answered appropriately.      FOLLOW-UP:   Return to clinic in four weeks. No imaging required at next visit.           Valery Neff [facility], PASanketC Orthopedic Surgery / Sports Medicine Specialist  [facility]  [address] [place] [address] [address] [place] [email or web address]  [email or web address]  t: [phone]  o: [phone]  f: [phone]    This note was dictated using Dragon software.  While it was briefly proofread prior to completion, some grammatical, spelling, and word choice errors due to  dictation may still occur.

## 2024-12-03 ENCOUNTER — OFFICE VISIT (OUTPATIENT)
Dept: FAMILY MEDICINE CLINIC | Facility: CLINIC | Age: 69
End: 2024-12-03
Payer: MEDICARE

## 2024-12-03 ENCOUNTER — TELEPHONE (OUTPATIENT)
Dept: FAMILY MEDICINE CLINIC | Facility: CLINIC | Age: 69
End: 2024-12-03

## 2024-12-03 VITALS
RESPIRATION RATE: 16 BRPM | OXYGEN SATURATION: 96 % | BODY MASS INDEX: 32.1 KG/M2 | DIASTOLIC BLOOD PRESSURE: 60 MMHG | HEIGHT: 64 IN | HEART RATE: 92 BPM | SYSTOLIC BLOOD PRESSURE: 130 MMHG | WEIGHT: 188 LBS

## 2024-12-03 DIAGNOSIS — M25.521 RIGHT ELBOW PAIN: ICD-10-CM

## 2024-12-03 DIAGNOSIS — I10 ESSENTIAL HYPERTENSION, BENIGN: ICD-10-CM

## 2024-12-03 DIAGNOSIS — E11.65 UNCONTROLLED TYPE 2 DIABETES MELLITUS WITH HYPERGLYCEMIA (HCC): Primary | ICD-10-CM

## 2024-12-03 LAB — HEMOGLOBIN A1C: 9.1 % (ref 4.3–5.6)

## 2024-12-03 PROCEDURE — 99214 OFFICE O/P EST MOD 30 MIN: CPT | Performed by: FAMILY MEDICINE

## 2024-12-03 PROCEDURE — 83036 HEMOGLOBIN GLYCOSYLATED A1C: CPT | Performed by: FAMILY MEDICINE

## 2024-12-03 NOTE — TELEPHONE ENCOUNTER
Received Diabetic eye exam from [facility]. Placed in Dr. Tomlinson's in box for review.

## 2024-12-03 NOTE — PROGRESS NOTES
Chief Complaint   Patient presents with    Diabetes     Patient here for diabetes follow up      HPI:   Allen Amezcua is a 68 year old male who presents for a diabetic visit.  Last A1C 8.4.  up to 9.1 today.  This is despite the weight loss.  Feels fatigued since mid summer.  At times off balance.    Typical blood sugar levels are ~170-190s.  Worse on vacation and around the holidays  Current diabetic medications are: Jardiance 25mg, metformin 1000mg, Januvia.    Diet: up and down.   Exercise: walking.    Last Eye exam: [facility] this am.  Normal.  No DR.   Last Foot exam: done previously.      HTN -blood pressure controlled today  Stable medicines    R elbow -s/p another round of antibiotics with the orthopedic team.  Now in physical therapy, week 7  Still given some range of motion limitations    Wt Readings from Last 6 Encounters:   12/03/24 188 lb (85.3 kg)   10/30/24 190 lb (86.2 kg)   08/20/24 208 lb (94.3 kg)   05/14/24 206 lb 8 oz (93.7 kg)   04/18/24 209 lb (94.8 kg)   09/29/23 209 lb 4 oz (94.9 kg)      Relevant Labs:   Chemistry Labs:   HEMOGLOBIN A1C (%)   Date Value   08/20/2024 8.4 (A)   08/10/2021 7.9 (A)   07/15/2020 7.6 (A)     HEMOGLOBIN A1c (% of total Hgb)   Date Value   05/08/2024 8.3 (H)   09/26/2023 8.4 (H)   02/24/2023 7.8 (H)      Lab Results   Component Value Date/Time    LDL 75 05/08/2024 07:12 AM    HDL 38 (L) 05/08/2024 07:12 AM    AST 41 (H) 05/08/2024 07:12 AM    ALT 42 05/08/2024 07:12 AM      Lab Results   Component Value Date/Time    CREATSERUM 1.18 05/08/2024 07:12 AM        Urine Studies:   Micro Albumen/Creatinine:    Lab Results   Component Value Date    MALBCREACALC 10 05/08/2024    MALBCREACALC 8 02/24/2023    MALBCREACALC 6 05/12/2021       Social History     Tobacco Use    Smoking status: Former     Current packs/day: 0.00     Average packs/day: 1 pack/day for 20.0 years (20.0 ttl pk-yrs)     Types: Cigarettes     Start date: 1/1/1970     Quit date: 1/1/1990     Years since  quittin.9    Smokeless tobacco: Never   Substance Use Topics    Alcohol use: Yes     Alcohol/week: 6.0 - 9.0 standard drinks of alcohol     Types: 6 - 9 Standard drinks or equivalent per week     Comment: 3 servings 2-3 days a week       REVIEW OF SYSTEMS:     Constitutional: fatigue  Eyes: negative  Ears, nose, mouth, throat, and face: negative  Respiratory: negative  Cardiovascular: negative  Gastrointestinal: negative  Genitourinary: frequent urination  Neurological: negative  MSK: R elbow issues.     EXAM:     /60   Pulse 92   Resp 16   Ht 5' 4\" (1.626 m)   Wt 188 lb (85.3 kg)   SpO2 96%   BMI 32.27 kg/m²   Wt Readings from Last 6 Encounters:   24 188 lb (85.3 kg)   10/30/24 190 lb (86.2 kg)   24 208 lb (94.3 kg)   24 206 lb 8 oz (93.7 kg)   24 209 lb (94.8 kg)   23 209 lb 4 oz (94.9 kg)       General: alert, well appearing, and in no distress  HEENT: oropharynx clear, pupils equal and reactive, extraocular eye movements intact  CV: regular rate and rhythm, no murmurs  Resp: clear to auscultation, no wheezes, rales or rhonchi, symmetric air entry  Abdomen: soft, nontender, nondistended, no masses or organomegaly  Neuro: alert, oriented, normal speech, no focal findings or movement disorder noted  Ext/Foot: no edema.     ASSESSMENT AND PLAN:     Allen Amezcua was seen in the office today:  had concerns including Diabetes (Patient here for diabetes follow up).    1. Uncontrolled type 2 diabetes mellitus with hyperglycemia (HCC)  Sugars elevated despite about a 20 pound weight loss   Now taking Jardiance, metformin, Januvia  Discussed options.  It is discouraging to see this much weight loss without a significant improvement in the A1c or symptoms   Wishing to try this a little longer to see if getting out of the holidays will help a little bit more.  Continue medicines, watch diet and activity  Will recheck A1c around March timeframe  - POC Hemoglobin A1C    2.  Essential hypertension, benign  Blood pressure controlled today  Stable medicines    3. Right elbow pain  Elbow continues to give him some bother  In physical therapy  Completed a second round of antibiotics      Geovanny Tomlinson M.D.   EMG 3  12/03/24 Return March, for Diabetic follow-up.

## 2024-12-26 ENCOUNTER — MED REC SCAN ONLY (OUTPATIENT)
Facility: CLINIC | Age: 69
End: 2024-12-26

## 2024-12-26 DIAGNOSIS — K21.9 GASTROESOPHAGEAL REFLUX DISEASE: ICD-10-CM

## 2024-12-26 DIAGNOSIS — I10 ESSENTIAL HYPERTENSION, BENIGN: ICD-10-CM

## 2024-12-26 DIAGNOSIS — E11.9 TYPE 2 DIABETES MELLITUS WITHOUT COMPLICATION, WITHOUT LONG-TERM CURRENT USE OF INSULIN (HCC): ICD-10-CM

## 2024-12-26 DIAGNOSIS — E78.5 HYPERLIPIDEMIA LDL GOAL <100: ICD-10-CM

## 2024-12-30 RX ORDER — FENOFIBRATE 145 MG/1
TABLET, COATED ORAL
Qty: 90 TABLET | Refills: 3 | Status: SHIPPED | OUTPATIENT
Start: 2024-12-30

## 2024-12-30 RX ORDER — SITAGLIPTIN 100 MG/1
100 TABLET, FILM COATED ORAL DAILY
Qty: 90 TABLET | Refills: 3 | Status: SHIPPED | OUTPATIENT
Start: 2024-12-30

## 2024-12-30 RX ORDER — LOSARTAN POTASSIUM 100 MG/1
100 TABLET ORAL DAILY
Qty: 90 TABLET | Refills: 3 | Status: SHIPPED | OUTPATIENT
Start: 2024-12-30

## 2024-12-30 RX ORDER — ROSUVASTATIN CALCIUM 10 MG/1
10 TABLET, COATED ORAL NIGHTLY
Qty: 90 TABLET | Refills: 3 | Status: SHIPPED | OUTPATIENT
Start: 2024-12-30

## 2024-12-30 RX ORDER — HYDROCHLOROTHIAZIDE 25 MG/1
25 TABLET ORAL DAILY
Qty: 90 TABLET | Refills: 3 | Status: SHIPPED | OUTPATIENT
Start: 2024-12-30

## 2024-12-30 NOTE — TELEPHONE ENCOUNTER
Requested Prescriptions     Signed Prescriptions Disp Refills    OMEPRAZOLE 20 MG Oral Capsule Delayed Release 90 capsule 3     Sig: TAKE 1 CAPSULE EVERY MORNING     Authorizing Provider: SERA ATKINSON     Ordering User: SPENCER ARIAS    HYDROCHLOROTHIAZIDE 25 MG Oral Tab 90 tablet 3     Sig: TAKE 1 TABLET DAILY     Authorizing Provider: SERA ATKINSON     Ordering User: SPENCER ARIAS    ROSUVASTATIN 10 MG Oral Tab 90 tablet 3     Sig: TAKE 1 TABLET NIGHTLY     Authorizing Provider: SERA ATKINSON     Ordering User: SPENCER ARIAS    fenofibrate 145 MG Oral Tab 90 tablet 3     Sig: TAKE 1 TABLET DAILY     Authorizing Provider: SERA ATKINSON     Ordering User: SPENCER ARIAS    JANUVIA 100 MG Oral Tab 90 tablet 3     Sig: TAKE 1 TABLET DAILY     Authorizing Provider: SERA ATKINSON     Ordering User: SPENCER ARIAS    metFORMIN HCl 1000 MG Oral Tab 180 tablet 3     Sig: TAKE 1 TABLET TWICE A DAY WITH MEALS     Authorizing Provider: SERA ATKINSON     Ordering User: SPENCER ARIAS    LOSARTAN 100 MG Oral Tab 90 tablet 3     Sig: TAKE 1 TABLET DAILY     Authorizing Provider: SERA ATKINSON     Ordering User: SPENCER ARIAS      Refilled per protocol/OV notes

## 2025-01-29 ENCOUNTER — MED REC SCAN ONLY (OUTPATIENT)
Facility: CLINIC | Age: 70
End: 2025-01-29

## 2025-02-04 ENCOUNTER — MED REC SCAN ONLY (OUTPATIENT)
Facility: CLINIC | Age: 70
End: 2025-02-04

## 2025-02-17 ENCOUNTER — TELEPHONE (OUTPATIENT)
Dept: FAMILY MEDICINE CLINIC | Facility: CLINIC | Age: 70
End: 2025-02-17

## 2025-02-17 NOTE — TELEPHONE ENCOUNTER
Few days  of frequency urgency and burning. Advised [facility], pt having transportation issues. Appointment made for Wednesday with Dr. Tomlinson

## 2025-02-19 ENCOUNTER — OFFICE VISIT (OUTPATIENT)
Dept: FAMILY MEDICINE CLINIC | Facility: CLINIC | Age: 70
End: 2025-02-19
Payer: MEDICARE

## 2025-02-19 VITALS
HEIGHT: 64 IN | BODY MASS INDEX: 30.56 KG/M2 | WEIGHT: 179 LBS | RESPIRATION RATE: 16 BRPM | SYSTOLIC BLOOD PRESSURE: 120 MMHG | DIASTOLIC BLOOD PRESSURE: 56 MMHG

## 2025-02-19 DIAGNOSIS — N30.01 ACUTE CYSTITIS WITH HEMATURIA: ICD-10-CM

## 2025-02-19 DIAGNOSIS — R35.0 URINARY FREQUENCY: Primary | ICD-10-CM

## 2025-02-19 LAB
BILIRUBIN: NEGATIVE
GLUCOSE (URINE DIPSTICK): >=1000 MG/DL
KETONES (URINE DIPSTICK): NEGATIVE MG/DL
MULTISTIX LOT#: ABNORMAL NUMERIC
NITRITE, URINE: NEGATIVE
PH, URINE: 5.5 (ref 4.5–8)
PROTEIN (URINE DIPSTICK): NEGATIVE MG/DL
SPECIFIC GRAVITY: 1.01 (ref 1–1.03)
URINE-COLOR: YELLOW
UROBILINOGEN,SEMI-QN: 0.2 MG/DL (ref 0–1.9)

## 2025-02-19 PROCEDURE — 81003 URINALYSIS AUTO W/O SCOPE: CPT | Performed by: FAMILY MEDICINE

## 2025-02-19 PROCEDURE — 99213 OFFICE O/P EST LOW 20 MIN: CPT | Performed by: FAMILY MEDICINE

## 2025-02-19 RX ORDER — CIPROFLOXACIN 500 MG/1
500 TABLET, FILM COATED ORAL 2 TIMES DAILY
Qty: 14 TABLET | Refills: 0 | Status: SHIPPED | OUTPATIENT
Start: 2025-02-19 | End: 2025-02-26

## 2025-02-19 NOTE — PROGRESS NOTES
Chief Complaint   Patient presents with    Urinary Symptoms     Patient here for UTI symptoms      HPI:   Allen Amezcua is a 69 year old male with PMH DM, Jardiance use who presents to the office for urinary symptoms.  Urinary freqnuency, but reduced urine volumes.   Symptoms picked up yesterday.    Mild dysuria.      No fever, nausea, vomiting.   Felt a little off the last few days.     REVIEW OF SYSTEMS:   Pertinent items are noted in HPI.  EXAM:   /56   Resp 16   Ht 5' 4\" (1.626 m)   Wt 179 lb (81.2 kg)   BMI 30.73 kg/m²     General appearance - alert, well appearing, and in no distress  Abdomen - soft, nontender, nondistended, no masses or organomegaly  no CVA tenderness    Results for orders placed or performed in visit on 02/19/25   URINALYSIS, AUTO, W/O SCOPE    Collection Time: 02/19/25  2:14 PM   Result Value Ref Range    Glucose Urine >=1000 (A) Negative mg/dL    Bilirubin Urine Negative Negative    Ketones, UA Negative Negative - Trace mg/dL    Spec Gravity 1.015 1.005 - 1.030    Blood Urine Trace-intact (A) Negative    PH Urine 5.5 5.0 - 8.0    Protein Urine Negative Negative - Trace mg/dL    Urobilinogen Urine 0.2 0.2 - 1.0 mg/dL    Nitrite Urine Negative Negative    Leukocyte Esterase Urine Trace (A) Negative    APPEARANCE Cloudy Clear    Color Urine Yellow Yellow    Multistix Lot# [id number] Numeric    Multistix Expiration Date 09/30/26 Date      ASSESSMENT AND PLAN:     Allen Amezcua was seen in the office today:  had concerns including Urinary Symptoms (Patient here for UTI symptoms).    1. Urinary frequency  - URINALYSIS, AUTO, W/O SCOPE    2. Acute cystitis with hematuria  Infection, likely spurred by the diabetes and the Jardiance  Will start treatment with Cipro twice a day for a week  Stay hydrated, control sugars is much as possible  If symptoms persist, may need to consider holding the Jardiance for some time.  - Urine Culture, Routine [E]  - ciprofloxacin 500 MG Oral Tab; Take 1 tablet  (500 mg total) by mouth 2 (two) times daily for 7 days.  Dispense: 14 tablet; Refill: 0        Geovanny Tomlinson M.D.   EMG 3  02/19/25          Note to patient: The 21 Century Cures Act makes medical notes like these available to patients in the interest of transparency. However, be advised this is a medical document. It is intended as peer to peer communication. It is written in medical language and may contain abbreviations or verbiage that are unfamiliar. It may appear blunt or direct. Medical documents are intended to carry relevant information, facts as evident, and the clinical opinion of the practitioner.

## 2025-03-18 ENCOUNTER — OFFICE VISIT (OUTPATIENT)
Dept: FAMILY MEDICINE CLINIC | Facility: CLINIC | Age: 70
End: 2025-03-18
Payer: MEDICARE

## 2025-03-18 VITALS
RESPIRATION RATE: 16 BRPM | SYSTOLIC BLOOD PRESSURE: 104 MMHG | HEIGHT: 64 IN | WEIGHT: 187 LBS | BODY MASS INDEX: 31.92 KG/M2 | DIASTOLIC BLOOD PRESSURE: 60 MMHG

## 2025-03-18 DIAGNOSIS — N40.1 BPH ASSOCIATED WITH NOCTURIA: ICD-10-CM

## 2025-03-18 DIAGNOSIS — E11.65 UNCONTROLLED TYPE 2 DIABETES MELLITUS WITH HYPERGLYCEMIA (HCC): Primary | ICD-10-CM

## 2025-03-18 DIAGNOSIS — Z12.5 SCREENING FOR MALIGNANT NEOPLASM OF PROSTATE: ICD-10-CM

## 2025-03-18 DIAGNOSIS — R35.1 BPH ASSOCIATED WITH NOCTURIA: ICD-10-CM

## 2025-03-18 DIAGNOSIS — I10 ESSENTIAL HYPERTENSION, BENIGN: ICD-10-CM

## 2025-03-18 DIAGNOSIS — E78.5 HYPERLIPIDEMIA LDL GOAL <100: ICD-10-CM

## 2025-03-18 LAB — HEMOGLOBIN A1C: 8.5 % (ref 4.3–5.6)

## 2025-03-18 PROCEDURE — 99214 OFFICE O/P EST MOD 30 MIN: CPT | Performed by: FAMILY MEDICINE

## 2025-03-18 PROCEDURE — 83036 HEMOGLOBIN GLYCOSYLATED A1C: CPT | Performed by: FAMILY MEDICINE

## 2025-03-18 NOTE — PROGRESS NOTES
Chief Complaint   Patient presents with    Diabetes     Patient here for diabetes follow up      HPI:   Allen Amezcua is a 69 year old male who presents for a diabetic visit.    Typical blood sugar levels are better.  Not having spikes above 200.  Average BS has gone down from 180 to 165  Current diabetic medications are: Jardiance, Januvia, metfomrin  Diet: better choices.   Exercise: bike, strength training  Last Eye exam: 2024.    Last Foot exam: done today.     HTN - BP controlled.  Taking losartan, HCTZ.      HLD - on statin - Crestor 10mg.  Also on fenofibrate.  Last LDL at goal in May 2024.  75.        Relevant Labs:   Chemistry Labs:   HEMOGLOBIN A1C (%)   Date Value   2024 9.1 (A)   2024 8.4 (A)   08/10/2021 7.9 (A)     HEMOGLOBIN A1c (% of total Hgb)   Date Value   2024 8.3 (H)   2023 8.4 (H)   2023 7.8 (H)      Lab Results   Component Value Date/Time    LDL 75 2024 07:12 AM    HDL 38 (L) 2024 07:12 AM    AST 41 (H) 2024 07:12 AM    ALT 42 2024 07:12 AM      Lab Results   Component Value Date/Time    CREATSERUM 1.18 2024 07:12 AM        Urine Studies: will collect in summer.   Micro Albumen/Creatinine:    Lab Results   Component Value Date    MALBCREACALC 10 2024    MALBCREACALC 8 2023    MALBCREACALC 6 2021       Social History     Tobacco Use    Smoking status: Former     Current packs/day: 0.00     Average packs/day: 1 pack/day for 20.0 years (20.0 ttl pk-yrs)     Types: Cigarettes     Start date: 1970     Quit date: 1990     Years since quittin.2    Smokeless tobacco: Never   Substance Use Topics    Alcohol use: Yes     Alcohol/week: 6.0 - 9.0 standard drinks of alcohol     Types: 6 - 9 Standard drinks or equivalent per week     Comment: 3 servings 2-3 days a week       REVIEW OF SYSTEMS:     Constitutional: negative  Eyes: negative  Ears, nose, mouth, throat, and face: negative  Respiratory:  negative  Cardiovascular: negative  Gastrointestinal: negative  Genitourinary: negative  Neurological: negative    EXAM:     /60   Resp 16   Ht 5' 4\" (1.626 m)   Wt 187 lb (84.8 kg)   BMI 32.10 kg/m²   Wt Readings from Last 6 Encounters:   03/18/25 187 lb (84.8 kg)   02/19/25 179 lb (81.2 kg)   12/03/24 188 lb (85.3 kg)   10/30/24 190 lb (86.2 kg)   08/20/24 208 lb (94.3 kg)   05/14/24 206 lb 8 oz (93.7 kg)       General: alert, well appearing, and in no distress  HEENT: oropharynx clear, pupils equal and reactive, extraocular eye movements intact  CV: regular rate and rhythm, no murmurs  Resp: clear to auscultation, no wheezes, rales or rhonchi, symmetric air entry  Abdomen: soft, nontender, nondistended, no masses or organomegaly  Neuro: alert, oriented, normal speech, no focal findings or movement disorder noted  Ext/Foot: Bilateral barefoot skin diabetic exam is normal, visualized feet and the appearance is normal.  Bilateral monofilament/sensation of both feet is normal.  Pulsation pedal pulse exam of both lower legs/feet is normal as well.       ASSESSMENT AND PLAN:     Allen Amezcua was seen in the office today:  had concerns including Diabetes (Patient here for diabetes follow up).    1. Uncontrolled type 2 diabetes mellitus with hyperglycemia (HCC)  A1c improved, but still above goal at 8.5  Happy to see the progress.  Home blood sugars have been trending in the right direction, especially in the last few weeks.  Spring and summer also here, he tends to be more activity better  Will continue the current trio of medicines, emphasis on healthy diet, exercise  Recheck in the summer.  - POC Hemoglobin A1C  - Comp Metabolic Panel (14)  - Hemoglobin A1C  - Microalb/Creat Ratio, Random Urine    2. Essential hypertension, benign  Blood pressure at goal  Stable meds  - Comp Metabolic Panel (14)    3. Hyperlipidemia LDL goal <100  On statin and fenofibrate.  Will check LDL with upcoming visit in summer  -  Lipid Panel    4. Screening for malignant neoplasm of prostate  5. BPH associated with nocturia  routine  - PSA, TOTAL WITH REFLEX TO PSA, FREE            Return in about 3 months (around 6/18/2025) for Medicare Wellness visit, Diabetic follow-up.    Geovanny Tomlinson M.D.   EMG 3  03/18/25

## 2025-05-25 DIAGNOSIS — E11.65 UNCONTROLLED TYPE 2 DIABETES MELLITUS WITH HYPERGLYCEMIA (HCC): ICD-10-CM

## 2025-05-25 RX ORDER — EMPAGLIFLOZIN 25 MG/1
25 TABLET, FILM COATED ORAL DAILY
Qty: 90 TABLET | Refills: 0 | Status: SHIPPED | OUTPATIENT
Start: 2025-05-25

## 2025-05-25 NOTE — TELEPHONE ENCOUNTER
Requested Prescriptions     Signed Prescriptions Disp Refills    empagliflozin (JARDIANCE) 25 MG Oral Tab 90 tablet 0     Sig: TAKE 1 TABLET BY MOUTH DAILY     Authorizing Provider: SERA ATKINSON     Ordering User: SPENCER ARIAS      Refilled per protocol/OV notes

## 2025-07-03 PROBLEM — E66.01 SEVERE OBESITY (BMI 35.0-39.9) WITH COMORBIDITY (HCC): Status: RESOLVED | Noted: 2018-09-26 | Resolved: 2025-07-03

## 2025-07-03 PROBLEM — E66.01 MORBID (SEVERE) OBESITY DUE TO EXCESS CALORIES (HCC): Status: RESOLVED | Noted: 2022-09-21 | Resolved: 2025-07-03

## 2025-07-10 LAB
ALBUMIN/GLOBULIN RATIO: 1.6 (CALC) (ref 1–2.5)
ALBUMIN: 4.3 G/DL (ref 3.6–5.1)
ALKALINE PHOSPHATASE: 55 U/L (ref 35–144)
ALT: 27 U/L (ref 9–46)
AST: 31 U/L (ref 10–35)
BILIRUBIN, TOTAL: 0.7 MG/DL (ref 0.2–1.2)
BUN: 24 MG/DL (ref 7–25)
CALCIUM: 10.1 MG/DL (ref 8.6–10.3)
CARBON DIOXIDE: 26 MMOL/L (ref 20–32)
CHLORIDE: 101 MMOL/L (ref 98–110)
CHOL/HDLC RATIO: 3.8 (CALC)
CHOLESTEROL, TOTAL: 157 MG/DL
CREATININE, RANDOM URINE: 55 MG/DL (ref 20–320)
CREATININE: 1.16 MG/DL (ref 0.7–1.28)
EGFR: 67 ML/MIN/1.73M2
GLOBULIN: 2.7 G/DL (CALC) (ref 1.9–3.7)
GLUCOSE: 182 MG/DL (ref 65–99)
HDL CHOLESTEROL: 41 MG/DL
HEMOGLOBIN A1C: 8.3 %
LDL-CHOLESTEROL: 85 MG/DL (CALC)
MICROALBUMIN/CREATININE RATIO, RANDOM URINE: 5 MG/G CREAT
MICROALBUMIN: 0.3 MG/DL
NON-HDL CHOLESTEROL: 116 MG/DL (CALC)
POTASSIUM: 4.3 MMOL/L (ref 3.5–5.3)
PROTEIN, TOTAL: 7 G/DL (ref 6.1–8.1)
SODIUM: 139 MMOL/L (ref 135–146)
TOTAL PSA: 2.2 NG/ML
TRIGLYCERIDES: 212 MG/DL

## 2025-07-15 ENCOUNTER — OFFICE VISIT (OUTPATIENT)
Dept: FAMILY MEDICINE CLINIC | Facility: CLINIC | Age: 70
End: 2025-07-15
Payer: MEDICARE

## 2025-07-15 VITALS
HEIGHT: 64 IN | SYSTOLIC BLOOD PRESSURE: 110 MMHG | RESPIRATION RATE: 16 BRPM | OXYGEN SATURATION: 93 % | WEIGHT: 186 LBS | DIASTOLIC BLOOD PRESSURE: 60 MMHG | BODY MASS INDEX: 31.76 KG/M2 | HEART RATE: 53 BPM

## 2025-07-15 DIAGNOSIS — E78.5 HYPERLIPIDEMIA LDL GOAL <100: ICD-10-CM

## 2025-07-15 DIAGNOSIS — G47.33 OBSTRUCTIVE SLEEP APNEA SYNDROME: ICD-10-CM

## 2025-07-15 DIAGNOSIS — E11.65 UNCONTROLLED TYPE 2 DIABETES MELLITUS WITH HYPERGLYCEMIA (HCC): ICD-10-CM

## 2025-07-15 DIAGNOSIS — I10 ESSENTIAL HYPERTENSION, BENIGN: ICD-10-CM

## 2025-07-15 DIAGNOSIS — Z12.11 COLON CANCER SCREENING: ICD-10-CM

## 2025-07-15 DIAGNOSIS — Z00.00 ENCOUNTER FOR ANNUAL HEALTH EXAMINATION: Primary | ICD-10-CM

## 2025-07-15 PROCEDURE — G0439 PPPS, SUBSEQ VISIT: HCPCS | Performed by: FAMILY MEDICINE

## 2025-07-15 PROCEDURE — 99213 OFFICE O/P EST LOW 20 MIN: CPT | Performed by: FAMILY MEDICINE

## 2025-07-15 NOTE — PROGRESS NOTES
Subjective:   Allen Amezcua is a 69 year old male who presents for a Subsequent Annual Wellness visit (Pt already had Initial Annual Wellness) and scheduled follow up of multiple significant but stable problems.             Preventative Screening  Colonoscopy - stool testing 5/2024.    Prostate -2.2  taking Tamsulosin.    Immunizations - TDaP 2015.  PNA UTD x 2.      DM -remains uncontrolled, A1c at 8.3.  Over time, this is about his baseline.  Most A1c's have been in the eights or nines.  Last under 8 was in February 2023 at 7.8.  Taking Jardiance, Januvia, metformin all at max dose.    Checks sugar daily in the AM.   Most days 150-160.  Total range 120-210.   Exercising more. Biking more. Weights.      HTN -taking hydrochlorothiazide, losartan.  Blood pressure controlled today    HLD -on fenofibrate and rosuvastatin.  Lipids remain at goal, with LDL 85.  Triglycerides still little high at 212.    ASHLEY - using CPAP.  This is effective, but causes dry mouth.  Uses nightly.      Gout - last flare many years ago.    History/Other:   Fall Risk Assessment:   He has been screened for Falls and is low risk.      Cognitive Assessment:   He had a completely normal cognitive assessment - see flowsheet entries     Functional Ability/Status:   Allen Amezcua has a completely normal functional assessment. See flowsheet for details.      Depression Screening (PHQ):  PHQ-2 SCORE: 0  , done 7/15/2025             Advanced Directives:   He does have a Living Will but we do NOT have it on file in Epic.    He does have a POA but we do NOT have it on file in Epic.    Discussed Advance Care Planning with patient (and family/surrogate if present). Standard forms made available to patient in After Visit Summary.      Problem List[1]  Allergies:  He is allergic to lisinopril.    Current Medications:  Active Meds, Sig Only[2]    Medical History:  He  has a past medical history of Essential hypertension, benign (11/22/2012), Gouty arthropathy  (6/29/2012), Hyperlipidemia (6/29/2012), Sleep apnea (6/29/2012), Type II diabetes mellitus (6/29/2012), and Visual impairment.  Surgical History:  He  has a past surgical history that includes other surgical history (1990); hernia surgery; tonsillectomy; General sleep study (3/10/03); knee replacement surgery (11/2014); and repair ing hernia,5+y/o,reducibl.   Family History:  His family history includes Heart Disorder in his father.  Social History:  He  reports that he quit smoking about 35 years ago. His smoking use included cigarettes. He started smoking about 55 years ago. He has a 20 pack-year smoking history. He has never used smokeless tobacco. He reports current alcohol use of about 6.0 - 9.0 standard drinks of alcohol per week. He reports that he does not use drugs.    Tobacco:  He smoked tobacco in the past but quit greater than 12 months ago.  Tobacco Use[3]     CAGE Alcohol Screen:   CAGE screening score of 0 on 7/15/2025, showing low risk of alcohol abuse.      Patient Care Team:  Geovanny Tomlinson MD as PCP - General (Family Medicine)  Mario Up PT as Physical Therapist  Jesse Casey MD as Consulting Physician (OPHTHALMOLOGY)    Review of Systems  Constitutional: negative  Eyes: negative  Ears, nose, mouth, throat, and face: negative  Respiratory: negative  Cardiovascular: negative  Gastrointestinal: negative  Genitourinary: negative  Neurological: negative      Objective:   Physical Exam  General Appearance:  Alert, cooperative, no distress, appears stated age   Head:  Normocephalic, without obvious abnormality, atraumatic   Eyes:  PERRL, conjunctiva/corneas clear, EOM's intact   Neck: Supple, symmetrical, trachea midline, no adenopathy   Back:   Symmetric, no curvature, ROM normal, no CVA tenderness   Lungs:   Clear to auscultation bilaterally, respirations unlabored   Chest Wall:  No tenderness or deformity   Heart:  Regular rate and rhythm, S1, S2 normal, no murmur, rub or gallop    Abdomen:   Soft, non-tender, bowel sounds active all four quadrants,  no masses, no organomegaly   Extremities: Bilateral barefoot skin diabetic exam is normal, visualized feet and the appearance is normal.  Bilateral monofilament/sensation of both feet is normal.  Pulsation pedal pulse exam of both lower legs/feet is normal as well.   Pulses: 2+ and symmetric   Skin: Skin color, texture, turgor normal, no rashes or lesions   Neurologic: Normal      /60   Pulse 53   Resp 16   Ht 5' 4\" (1.626 m)   Wt 186 lb (84.4 kg)   SpO2 93%   BMI 31.93 kg/m²  Estimated body mass index is 31.93 kg/m² as calculated from the following:    Height as of this encounter: 5' 4\" (1.626 m).    Weight as of this encounter: 186 lb (84.4 kg).    Medicare Hearing Assessment:   Hearing Screening    Time taken: 7/15/2025  8:12 AM  Screening Method: Whisper Test  Whisper Test Result: Pass         Visual Acuity:   Right Eye Visual Acuity: Uncorrected Right Eye Chart Acuity: 20/70   Left Eye Visual Acuity: Uncorrected Left Eye Chart Acuity: 20/50   Both Eyes Visual Acuity: Uncorrected Both Eyes Chart Acuity: 20/40   Able To Tolerate Visual Acuity: Yes        Assessment & Plan:     Allen Amezcua was seen in the office today:  had concerns including Health Maintenance (Reviewed Preventative/Wellness form with patient./).    1. Encounter for annual health examination  Overall well  Still active - improved exercise  No chest pains or SOB  Labs reassugin outside of the DM, which is stable in the low 8s and has been there consistently.   FIT testing  Normal PSA  Immunizations discussed.  Eligible for TDaP, shingles vaccine.     2. Colon cancer screening  Due again  FIT testing requested.  Order placed  - INSURE FECAL GLOBIN by IMMUNOASSAY [[id number]] [Q]    3. Uncontrolled type 2 diabetes mellitus with hyperglycemia (HCC)  A1C remains in the same range - low 8s is where he typically is  This is despite medications, good exercise  Continue to be  mindful of diet.  Discussed options - sulfonylureas, injectables.  Hesitant to add a med with weight gain side effect, not ready for injectables.   Will monitor.  Controlling other risk factors.   Watch diet.     4. Essential hypertension, benign  BP controlled  Meds stable.     5. Hyperlipidemia LDL goal <100  On statin.  Lipids at goal    6. Obstructive sleep apnea syndrome  Continue regular use of the CPAP.  Continues to benefit from this.              The patient indicates understanding of these issues and agrees to the plan.  Reinforced healthy diet, lifestyle, and exercise.      No follow-ups on file.     Geovanny Tomlinson MD, 7/15/2025     Supplementary Documentation:   General Health:  In the past six months, have you lost more than 10 pounds without trying?: 2 - No  Has your appetite been poor?: No  Type of Diet: Diabetic  How does the patient maintain a good energy level?: Appropriate Exercise  How would you describe your daily physical activity?: Moderate  How would you describe your current health state?: Fair  How do you maintain positive mental well-being?: Social Interaction, Visiting Friends, Visiting Family  On a scale of 0 to 10, with 0 being no pain and 10 being severe pain, what is your pain level?: 3 - (Mild)  In the past six months, have you experienced urine leakage?: 0-No  At any time do you feel concerned for the safety/well-being of yourself and/or your children, in your home or elsewhere?: No  Have you had any immunizations at another office such as Influenza, Hepatitis B, Tetanus, or Pneumococcal?: No    Health Maintenance   Topic Date Due    Zoster Vaccines (1 of 2) Never done    Colorectal Cancer Screening  05/21/2024    COVID-19 Vaccine (4 - 2024-25 season) 09/01/2024    Pneumococcal Vaccine: 50+ Years (3 of 3 - PCV20 or PCV21) 01/08/2025    Annual Physical  05/14/2025    Influenza Vaccine (1) 10/01/2025    Diabetes Care A1C  10/09/2025    Diabetes Care Dilated Eye Exam  12/03/2025     Diabetes Care Foot Exam  2026    Diabetes Care: GFR  2026    PSA  2027    Annual Depression Screening  Completed    Fall Risk Screening (Annual)  Completed    Diabetes Care: Microalb/Creat Ratio (Annual)  Completed    Meningococcal B Vaccine  Aged Out          [1]   Patient Active Problem List  Diagnosis    Type 2 diabetes mellitus without complication, without long-term current use of insulin (HCC)    Hyperlipidemia LDL goal <100    Obstructive sleep apnea syndrome    Gouty arthropathy    Essential hypertension, benign    Diverticula of colon    Status post left knee replacement   [2]   Outpatient Medications Marked as Taking for the 7/15/25 encounter (Office Visit) with Geovanny Tomlinson MD   Medication Sig    empagliflozin (JARDIANCE) 25 MG Oral Tab TAKE 1 TABLET BY MOUTH DAILY    tamsulosin 0.4 MG Oral Cap Take 1 capsule (0.4 mg total) by mouth daily.    rosuvastatin 10 MG Oral Tab Take 1 tablet (10 mg total) by mouth nightly.    omeprazole 20 MG Oral Capsule Delayed Release TAKE 1 CAPSULE EVERY       MORNING    metFORMIN HCl 1000 MG Oral Tab TAKE 1 TABLET TWICE A DAY WITH MEALS    losartan 100 MG Oral Tab Take 1 tablet (100 mg total) by mouth daily.    SITagliptin Phosphate (JANUVIA) 100 MG Oral Tab Take 1 tablet (100 mg total) by mouth daily.    hydroCHLOROthiazide 25 MG Oral Tab Take 1 tablet (25 mg total) by mouth daily.    fenofibrate 145 MG Oral Tab TAKE 1 TABLET DAILY   [3]   Social History  Tobacco Use   Smoking Status Former    Current packs/day: 0.00    Average packs/day: 1 pack/day for 20.0 years (20.0 ttl pk-yrs)    Types: Cigarettes    Start date: 1970    Quit date: 1990    Years since quittin.5   Smokeless Tobacco Never

## 2025-08-05 DIAGNOSIS — E11.65 UNCONTROLLED TYPE 2 DIABETES MELLITUS WITH HYPERGLYCEMIA (HCC): ICD-10-CM

## 2025-08-07 RX ORDER — EMPAGLIFLOZIN 25 MG/1
25 TABLET, FILM COATED ORAL DAILY
Qty: 90 TABLET | Refills: 3 | Status: SHIPPED | OUTPATIENT
Start: 2025-08-07

## (undated) DIAGNOSIS — K21.9 GASTROESOPHAGEAL REFLUX DISEASE: ICD-10-CM

## (undated) DIAGNOSIS — I10 ESSENTIAL HYPERTENSION, BENIGN: ICD-10-CM

## (undated) DIAGNOSIS — E78.5 HYPERLIPIDEMIA LDL GOAL <100: ICD-10-CM

## (undated) DIAGNOSIS — E11.9 TYPE 2 DIABETES MELLITUS WITHOUT COMPLICATION, WITHOUT LONG-TERM CURRENT USE OF INSULIN (HCC): ICD-10-CM

## (undated) DEVICE — SHOULDER P.A.D II: Brand: DEROYAL

## (undated) DEVICE — DRAPE,U/SHT,SPLIT,FILM,60X84,STERILE: Brand: MEDLINE

## (undated) DEVICE — UNDYED BRAIDED (POLYGLACTIN 910), SYNTHETIC ABSORBABLE SUTURE: Brand: COATED VICRYL

## (undated) DEVICE — ABDOMINAL PAD: Brand: DERMACEA

## (undated) DEVICE — GOWN SURG AERO CHROME XXL

## (undated) DEVICE — [STANDARD 12-FLUTE BARREL BUR, ARTHROSCOPIC SHAVER BLADE,  DO NOT RESTERILIZE,  DO NOT USE IF PACKAGE IS DAMAGED,  KEEP DRY,  KEEP AWAY FROM SUNLIGHT]: Brand: FORMULA

## (undated) DEVICE — CANNULA 7MM TWIST AR-6570

## (undated) DEVICE — STERILE POLYISOPRENE POWDER-FREE SURGICAL GLOVES: Brand: PROTEXIS

## (undated) DEVICE — [AGGRESSIVE PLUS CUTTER, ARTHROSCOPIC SHAVER BLADE,  DO NOT RESTERILIZE,  DO NOT USE IF PACKAGE IS DAMAGED,  KEEP DRY,  KEEP AWAY FROM SUNLIGHT]: Brand: FORMULA

## (undated) DEVICE — SHOULDER TRACTION KIT AR-1635

## (undated) DEVICE — KENDALL SCD EXPRESS SLEEVES, KNEE LENGTH, MEDIUM: Brand: KENDALL SCD

## (undated) DEVICE — TUBING IRR 16FT CNT WV 3 ASCP

## (undated) DEVICE — Device

## (undated) DEVICE — NEEDLE SCORPION AR-13995N

## (undated) DEVICE — SUPER TURBOVAC 90 IFS: Brand: COBLATION

## (undated) DEVICE — SUPER SPONGES,MEDIUM: Brand: KERLIX

## (undated) DEVICE — CANNULA 6MM TWIST AR-6535

## (undated) DEVICE — MEDI-VAC NON-CONDUCTIVE SUCTION TUBING: Brand: CARDINAL HEALTH

## (undated) DEVICE — SHEET,DRAPE,70X100,STERILE: Brand: MEDLINE

## (undated) DEVICE — SOL LACT RINGERS 3000ML

## (undated) DEVICE — 3M™ IOBAN™ 2 ANTIMICROBIAL INCISE DRAPE 6648EZ: Brand: IOBAN™ 2

## (undated) DEVICE — SHOULDER ARTHROSCOPY CDS-LF: Brand: MEDLINE INDUSTRIES, INC.

## (undated) DEVICE — NON-ADHERENT STRIPS,OIL EMULSION: Brand: CURITY

## (undated) DEVICE — SUTURE ETHILON 2-0 FS

## (undated) DEVICE — HANDLE LIGHT ECONOMY

## (undated) DEVICE — CHLORAPREP 26ML APPLICATOR

## (undated) NOTE — MR AVS SNAPSHOT
[address] [facility] [address]  [facility],  [address]  [address] [facility] [address] [place] [address]               Thank you for choosing us for your health care visit with Samuel Mendoza MD.  We are glad to serve you and happy to provide you with this s to check it regularly. Doing so can save your life. Here are some things you can do to help control your blood pressure. Choose heart-healthy foods  · Select low-salt, low-fat foods.  Limit sodium intake to 2,400 mg per day or the amount suggested by you · Women should have no more than 1 drink per day. · Talk with your healthcare provider about quitting smoking. Smoking significantly increases your risk for heart disease and stroke.  Ask your healthcare provider about community smoking cessation programs If you've recently had a stay at the Hospital you can access your discharge instructions in [employer] by going to Visits < Admission Summaries.  If you've been to the Emergency Department or your doctor's office, you can view your past visit information in My

## (undated) NOTE — Clinical Note
[facility], [address] [facility], [address] ,[address], [facility] [address] [address] [facility] [phone]        Magdalena Davila MD  • Armida Bueno MD • Lori Garza MD • DO Cullen Lomeli PA-C • Peter Ziegler, ROSAN

## (undated) NOTE — LETTER
11/25/20  Post-operative Arthroscopy    Medication: Before you leave the hospital, you will be given a prescription for a pain reliever. This medication contains a narcotic with acetaminophen (Tylenol), so do not take any additional Tylenol.     You may michael Follow up: You will be scheduled for a follow up office visit in 7-10 days to have your sutures taken out. Your plan for physical therapy, driving, and returning to work will be discussed at this appointment.      Please don't hesitate to contact our office

## (undated) NOTE — LETTER
Pre-Admission Testing Department  Phone: [phone]  Right Fax: [phone]    ADDRESSEE INFORMATION: SENDER INFORMATION:   To:   *** From: ***     Department: Pre-Admission Testing   Fax Number: *** Date: 11/25/2020     Phone Number: *** Phone Numb This message is intended only for the use of the individual or entity to which it is addressed. It may have been disclosed to you from records whose confidentiality is protected by [employer] and applicable state law. Federal Regulation, [address]

## (undated) NOTE — LETTER
Patient Name: Yuliana Sofia  YOB: 1955          MRN number:  [id number]  Date:  12/15/2020  Referring Physician:  Kayli Galdamez         POST-OP SHOULDER EVALUATION:    Referring Physician: Dr. Sarah Noel  Diagnosis:  S/P right rotator cuff repair, la Sondra Sutherland presents to physical therapy evaluation s/p right rotator cuff repair, abral debridement, distal clavicle resection  12/1/2020. He currently is using immobilizer with ABD pillow. Following surgical precautions.   The results of the objective tests a Pt education was provided on exam findings, treatment diagnosis, treatment plan, expectations, and prognosis. Reviewed surgical protocol. tx was PROM of ER in varius degrees ABD followed by flexion. ER improved to 35 in neutral and flexion to 140.  Pt will Thank you for your referral. If you have any questions, please contact me at Dept: [phone]    Sincerely,  Electronically signed by therapist: Yamileth Lomas PT

## (undated) NOTE — LETTER
Patient Name: Emmy Borja  YOB: 1955          MRN number:  [id number]  Date:  8/27/2019  Referring Physician:  Faviola Dallas         SHOULDER EVALUATION:    Referring Physician: Dr. Ebony Webb  Diagnosis: R shoulder impingement     Date of intermittent R lateral shoulder pain that began recently after performing repetitive overhead activities. The results of the objective tests and measures show loss of abduction and flexion AROM due to impingement pain.  Decreased latissimus dorsi muscle le distractions followed by PROM ABD/IR, flex and ABD.  Patient was instructed in, performed and issued a HEP for: prone middle trapezius straight arm rows in neutral with 5 sec holds 10 reps followed by B UE slides in neutral CKC against wall to full overhead

## (undated) NOTE — MR AVS SNAPSHOT
[address] [facility] [address]  [facility],  [address]  [address] [place] [address] [phone]               Thank you for choosing us for your health care visit with Nicolle Vega MD.  We are glad to serve you and happy to provide you with this s view more details from this visit by going to [email or web address]. If you've recently had a stay at the Hospital you can access your discharge instructions in [employer] by going to Visits < Admission Summaries.  If you've been to the Emergency Depar